# Patient Record
Sex: FEMALE | Race: WHITE | Employment: FULL TIME | URBAN - METROPOLITAN AREA
[De-identification: names, ages, dates, MRNs, and addresses within clinical notes are randomized per-mention and may not be internally consistent; named-entity substitution may affect disease eponyms.]

---

## 2017-05-13 ENCOUNTER — HOSPITAL ENCOUNTER (OUTPATIENT)
Age: 37
Discharge: HOME | End: 2017-05-13
Attending: FAMILY MEDICINE
Payer: COMMERCIAL

## 2017-05-13 VITALS
BODY MASS INDEX: 36.7 KG/M2 | HEART RATE: 94 BPM | DIASTOLIC BLOOD PRESSURE: 78 MMHG | SYSTOLIC BLOOD PRESSURE: 150 MMHG | HEIGHT: 64 IN | OXYGEN SATURATION: 99 % | TEMPERATURE: 99 F | RESPIRATION RATE: 18 BRPM | WEIGHT: 215 LBS

## 2017-05-13 DIAGNOSIS — B96.89 ACUTE BACTERIAL TONSILLITIS: Primary | ICD-10-CM

## 2017-05-13 DIAGNOSIS — J03.80 ACUTE BACTERIAL TONSILLITIS: Primary | ICD-10-CM

## 2017-05-13 PROCEDURE — 99203 OFFICE O/P NEW LOW 30 MIN: CPT

## 2017-05-13 PROCEDURE — 99204 OFFICE O/P NEW MOD 45 MIN: CPT

## 2017-05-13 PROCEDURE — 87430 STREP A AG IA: CPT

## 2017-05-13 RX ORDER — AMOXICILLIN AND CLAVULANATE POTASSIUM 875; 125 MG/1; MG/1
1 TABLET, FILM COATED ORAL 2 TIMES DAILY
Qty: 20 TABLET | Refills: 0 | Status: SHIPPED | OUTPATIENT
Start: 2017-05-13 | End: 2017-05-23

## 2017-05-13 NOTE — ED INITIAL ASSESSMENT (HPI)
Per pt has been having sore throat for 2 days reports saw white patch also reports subjective fevers

## 2017-05-13 NOTE — ED PROVIDER NOTES
Patient Seen in: [address]    History   Patient presents with:  Sore Throat    Stated Complaint: sore throat    HPI    31year old patient with no significant PMHx presents with  sore throat for 2 days.   Pain with swallow are symmetrical with mod enlargement and erythema. + white exudate. Uvula midline.   LUNGS: b/l CTA, good air entry  CARDIO: RRR without murmur  EXTREMITIES: no cyanosis, clubbing or edema  SKIN: good skin turgor, no rashes          ED Course     Labs Revi

## 2017-06-16 ENCOUNTER — APPOINTMENT (OUTPATIENT)
Dept: LAB | Facility: REFERENCE LAB | Age: 37
End: 2017-06-16
Attending: OBSTETRICS & GYNECOLOGY
Payer: COMMERCIAL

## 2017-06-16 ENCOUNTER — OFFICE VISIT (OUTPATIENT)
Dept: OBGYN CLINIC | Facility: CLINIC | Age: 37
End: 2017-06-16

## 2017-06-16 VITALS
BODY MASS INDEX: 36.94 KG/M2 | DIASTOLIC BLOOD PRESSURE: 60 MMHG | WEIGHT: 216.38 LBS | SYSTOLIC BLOOD PRESSURE: 120 MMHG | HEIGHT: 64 IN

## 2017-06-16 DIAGNOSIS — Z12.4 ROUTINE CERVICAL SMEAR: ICD-10-CM

## 2017-06-16 DIAGNOSIS — N89.8 VAGINAL DISCHARGE: ICD-10-CM

## 2017-06-16 DIAGNOSIS — Z01.419 WOMEN'S ANNUAL ROUTINE GYNECOLOGICAL EXAMINATION: ICD-10-CM

## 2017-06-16 DIAGNOSIS — Z11.3 SCREENING EXAMINATION FOR VENEREAL DISEASE: Primary | ICD-10-CM

## 2017-06-16 DIAGNOSIS — Z11.3 SCREENING EXAMINATION FOR VENEREAL DISEASE: ICD-10-CM

## 2017-06-16 PROCEDURE — 99385 PREV VISIT NEW AGE 18-39: CPT | Performed by: OBSTETRICS & GYNECOLOGY

## 2017-06-16 PROCEDURE — 87491 CHLMYD TRACH DNA AMP PROBE: CPT | Performed by: OBSTETRICS & GYNECOLOGY

## 2017-06-16 PROCEDURE — 87624 HPV HI-RISK TYP POOLED RSLT: CPT | Performed by: OBSTETRICS & GYNECOLOGY

## 2017-06-16 PROCEDURE — 87591 N.GONORRHOEAE DNA AMP PROB: CPT | Performed by: OBSTETRICS & GYNECOLOGY

## 2017-06-16 PROCEDURE — 36415 COLL VENOUS BLD VENIPUNCTURE: CPT | Performed by: OBSTETRICS & GYNECOLOGY

## 2017-06-16 PROCEDURE — 87106 FUNGI IDENTIFICATION YEAST: CPT | Performed by: OBSTETRICS & GYNECOLOGY

## 2017-06-16 PROCEDURE — 88175 CYTOPATH C/V AUTO FLUID REDO: CPT | Performed by: OBSTETRICS & GYNECOLOGY

## 2017-06-16 PROCEDURE — 87205 SMEAR GRAM STAIN: CPT | Performed by: OBSTETRICS & GYNECOLOGY

## 2017-06-16 PROCEDURE — 87808 TRICHOMONAS ASSAY W/OPTIC: CPT | Performed by: OBSTETRICS & GYNECOLOGY

## 2017-06-16 NOTE — PROGRESS NOTES
GYN H&P     2017  8:27 AM    CC: Patient is here to establish care    HPI: Patient is a 31year old  here for annual and PAP requesting screening with concerns about persistent vaginal odor and discharge that is more prominent after menses.  No bilaterally nontender, no palpable masses, no nipple discharge, no skin changes, no axillary adenopathy  ABDOMEN: Soft, non distended; non tender, no masses  GYNE/:   External Genitalia: normal, no lesions, good perineal support  Urethra: meatus normal

## 2017-06-21 ENCOUNTER — TELEPHONE (OUTPATIENT)
Dept: OBGYN CLINIC | Facility: CLINIC | Age: 37
End: 2017-06-21

## 2017-06-21 RX ORDER — METRONIDAZOLE 500 MG/1
500 TABLET ORAL ONCE
Qty: 4 TABLET | Refills: 0 | Status: SHIPPED | OUTPATIENT
Start: 2017-06-21 | End: 2017-06-21

## 2017-06-21 NOTE — TELEPHONE ENCOUNTER
----- Message from Seferino Schwarz MD sent at 6/21/2017  2:46 PM CDT -----  PAP normal, HPV negative. Next annual one year/next PAP in 3    Trichomonas on PAP requiring treatment of you and partner  eRx for 2g.  oral Flagyl x one dose will be done today

## 2017-06-22 NOTE — TELEPHONE ENCOUNTER
Pt notified of lab test results. Notified of positve trich result. STD instructions given. Prescription for Flagyl sent to pharmacy. Pt voiced understanding.

## 2017-06-30 ENCOUNTER — PRIOR ORIGINAL RECORDS (OUTPATIENT)
Dept: OTHER | Age: 37
End: 2017-06-30

## 2017-06-30 ENCOUNTER — HOSPITAL ENCOUNTER (OUTPATIENT)
Dept: CV DIAGNOSTICS | Facility: HOSPITAL | Age: 37
Discharge: HOME | End: 2017-06-30
Attending: INTERNAL MEDICINE
Payer: COMMERCIAL

## 2017-06-30 ENCOUNTER — MYAURORA ACCOUNT LINK (OUTPATIENT)
Dept: OTHER | Age: 37
End: 2017-06-30

## 2017-06-30 DIAGNOSIS — R06.00 DYSPNEA: ICD-10-CM

## 2017-06-30 DIAGNOSIS — R00.2 PALPITATIONS: ICD-10-CM

## 2017-06-30 PROCEDURE — 93226 XTRNL ECG REC<48 HR SCAN A/R: CPT | Performed by: INTERNAL MEDICINE

## 2017-06-30 PROCEDURE — 93225 XTRNL ECG REC<48 HRS REC: CPT | Performed by: INTERNAL MEDICINE

## 2017-06-30 PROCEDURE — 93227 XTRNL ECG REC<48 HR R&I: CPT | Performed by: INTERNAL MEDICINE

## 2017-08-12 ENCOUNTER — HOSPITAL ENCOUNTER (OUTPATIENT)
Dept: CV DIAGNOSTICS | Facility: HOSPITAL | Age: 37
Discharge: HOME | End: 2017-08-12
Attending: INTERNAL MEDICINE

## 2017-08-12 ENCOUNTER — MYAURORA ACCOUNT LINK (OUTPATIENT)
Dept: OTHER | Age: 37
End: 2017-08-12

## 2017-08-12 DIAGNOSIS — R06.00 DYSPNEA, PAROXYSMAL NOCTURNAL: ICD-10-CM

## 2017-08-12 DIAGNOSIS — R00.2 PALPITATIONS: ICD-10-CM

## 2017-08-18 ENCOUNTER — PRIOR ORIGINAL RECORDS (OUTPATIENT)
Dept: OTHER | Age: 37
End: 2017-08-18

## 2018-01-08 ENCOUNTER — APPOINTMENT (OUTPATIENT)
Dept: GENERAL RADIOLOGY | Age: 38
End: 2018-01-08
Attending: FAMILY MEDICINE
Payer: COMMERCIAL

## 2018-01-08 ENCOUNTER — HOSPITAL ENCOUNTER (OUTPATIENT)
Age: 38
Discharge: HOME | End: 2018-01-08
Attending: FAMILY MEDICINE
Payer: COMMERCIAL

## 2018-01-08 VITALS
HEART RATE: 85 BPM | OXYGEN SATURATION: 100 % | SYSTOLIC BLOOD PRESSURE: 152 MMHG | DIASTOLIC BLOOD PRESSURE: 92 MMHG | TEMPERATURE: 99 F | RESPIRATION RATE: 20 BRPM

## 2018-01-08 DIAGNOSIS — S00.93XA CONTUSION OF HEAD, UNSPECIFIED PART OF HEAD, INITIAL ENCOUNTER: ICD-10-CM
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
## 2018-01-08 DIAGNOSIS — S20.222A BACK CONTUSION, LEFT, INITIAL ENCOUNTER: ICD-10-CM

## 2018-01-08 PROCEDURE — 99204 OFFICE O/P NEW MOD 45 MIN: CPT

## 2018-01-08 PROCEDURE — 72040 X-RAY EXAM NECK SPINE 2-3 VW: CPT | Performed by: FAMILY MEDICINE

## 2018-01-08 PROCEDURE — 99203 OFFICE O/P NEW LOW 30 MIN: CPT

## 2018-01-08 PROCEDURE — 72072 X-RAY EXAM THORAC SPINE 3VWS: CPT | Performed by: FAMILY MEDICINE

## 2018-01-08 RX ORDER — CYCLOBENZAPRINE HCL 10 MG
10 TABLET ORAL 3 TIMES DAILY PRN
Qty: 20 TABLET | Refills: 0 | Status: SHIPPED | OUTPATIENT
Start: 2018-01-08 | End: 2018-01-15

## 2018-01-09 NOTE — ED PROVIDER NOTES
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 Tenderness over the C-spine and the lower thoracic spine   Neurological: She is alert and oriented to person, place, and time. Skin: Skin is warm. Psychiatric: She has a normal mood and affect.  Her behavior is normal.   Eladio

## 2018-01-09 NOTE — ED INITIAL ASSESSMENT (HPI)
Pt here stating that she tripped over her dog and fell down the stairs and hit her head, pt states she did not lose consciousness but felt dizzy and was not able to sleep due head pain, pt states she has body aches as well from the fall

## 2018-01-09 NOTE — ED NOTES
Pt discharged home, prescription given to patient, pt instructed to follow up with primary md if symptoms do not improve

## 2018-11-14 ENCOUNTER — OFFICE VISIT (OUTPATIENT)
Dept: OBGYN CLINIC | Facility: CLINIC | Age: 38
End: 2018-11-14

## 2018-11-14 VITALS
HEIGHT: 64 IN | WEIGHT: 217.13 LBS | BODY MASS INDEX: 37.07 KG/M2 | DIASTOLIC BLOOD PRESSURE: 66 MMHG | SYSTOLIC BLOOD PRESSURE: 120 MMHG

## 2018-11-14 DIAGNOSIS — Z01.419 WOMEN'S ANNUAL ROUTINE GYNECOLOGICAL EXAMINATION: Primary | ICD-10-CM

## 2018-11-14 PROCEDURE — 99395 PREV VISIT EST AGE 18-39: CPT | Performed by: OBSTETRICS & GYNECOLOGY

## 2018-11-14 NOTE — PROGRESS NOTES
GYN ANNUAL    2018  4:23 PM    CC:Patient presents for yearly visit    HPI: Patient is a 41year old  here for annual gyn exam. Cycles regular. No pelvic pain. No abnormal vaginal discharge or bleeding.        OB History    Para Term Pre Exposure: Not Asked        Hobby Hazards: Not Asked        Sleep Concern: Not Asked        Stress Concern: Not Asked        Weight Concern: Not Asked        Special Diet: Not Asked        Back Care: Not Asked        Exercise: Not Asked        Bike Helmet: Author MD Renetta

## 2019-01-12 ENCOUNTER — HOSPITAL ENCOUNTER (OUTPATIENT)
Age: 39
Discharge: HOME | End: 2019-01-12
Payer: COMMERCIAL

## 2019-01-12 ENCOUNTER — APPOINTMENT (OUTPATIENT)
Dept: GENERAL RADIOLOGY | Age: 39
End: 2019-01-12
Attending: FAMILY MEDICINE
Payer: COMMERCIAL

## 2019-01-12 VITALS
SYSTOLIC BLOOD PRESSURE: 158 MMHG | HEART RATE: 114 BPM | OXYGEN SATURATION: 99 % | DIASTOLIC BLOOD PRESSURE: 81 MMHG | RESPIRATION RATE: 20 BRPM | TEMPERATURE: 99 F

## 2019-01-12 DIAGNOSIS — S63.642A SPRAIN OF METACARPOPHALANGEAL (MCP) JOINT OF LEFT THUMB, INITIAL ENCOUNTER: Primary | ICD-10-CM

## 2019-01-12 PROCEDURE — 99213 OFFICE O/P EST LOW 20 MIN: CPT

## 2019-01-12 PROCEDURE — 73140 X-RAY EXAM OF FINGER(S): CPT | Performed by: FAMILY MEDICINE

## 2019-01-12 RX ORDER — IBUPROFEN 600 MG/1
600 TABLET ORAL EVERY 8 HOURS PRN
Qty: 30 TABLET | Refills: 0 | Status: SHIPPED | OUTPATIENT
Start: 2019-01-12 | End: 2019-01-19

## 2019-01-12 NOTE — ED INITIAL ASSESSMENT (HPI)
Pt here with left thumb pain, pt states the pain started 2 weeks ago ,she suspects she strained it at work, pt states the pain shoots up to her arm every time she has any thumb movement

## 2019-01-12 NOTE — ED PROVIDER NOTES
Patient Seen in: [address]    History   Patient presents with:  Finger Pain    Stated Complaint: left thumb pain    HPI    16-year-old female presents with 3 weeks of intermittent thumb pain after attempting to lift a w normal limits   Neurological: She is alert and oriented to person, place, and time. Skin: Skin is warm. Capillary refill takes less than 2 seconds. Psychiatric: She has a normal mood and affect.  Her behavior is normal.   Nursing note and vitals reviewe

## 2019-02-28 VITALS
HEIGHT: 65 IN | SYSTOLIC BLOOD PRESSURE: 96 MMHG | WEIGHT: 215 LBS | HEART RATE: 82 BPM | DIASTOLIC BLOOD PRESSURE: 72 MMHG | BODY MASS INDEX: 35.82 KG/M2

## 2019-05-16 ENCOUNTER — APPOINTMENT (OUTPATIENT)
Dept: GENERAL RADIOLOGY | Age: 39
End: 2019-05-16
Attending: FAMILY MEDICINE
Payer: COMMERCIAL

## 2019-05-16 ENCOUNTER — HOSPITAL ENCOUNTER (OUTPATIENT)
Age: 39
Discharge: HOME | End: 2019-05-16
Attending: FAMILY MEDICINE
Payer: COMMERCIAL

## 2019-05-16 VITALS
HEIGHT: 64 IN | WEIGHT: 226 LBS | TEMPERATURE: 98 F | BODY MASS INDEX: 38.58 KG/M2 | HEART RATE: 87 BPM | DIASTOLIC BLOOD PRESSURE: 78 MMHG | SYSTOLIC BLOOD PRESSURE: 128 MMHG | RESPIRATION RATE: 18 BRPM | OXYGEN SATURATION: 100 %

## 2019-05-16 DIAGNOSIS — R03.0 ELEVATED BLOOD PRESSURE READING WITHOUT DIAGNOSIS OF HYPERTENSION: Primary | ICD-10-CM

## 2019-05-16 DIAGNOSIS — M79.644 PAIN OF RIGHT THUMB: ICD-10-CM

## 2019-05-16 PROCEDURE — 93005 ELECTROCARDIOGRAM TRACING: CPT

## 2019-05-16 PROCEDURE — 93010 ELECTROCARDIOGRAM REPORT: CPT | Performed by: FAMILY MEDICINE

## 2019-05-16 PROCEDURE — 80047 BASIC METABLC PNL IONIZED CA: CPT

## 2019-05-16 PROCEDURE — 36415 COLL VENOUS BLD VENIPUNCTURE: CPT

## 2019-05-16 PROCEDURE — 93010 ELECTROCARDIOGRAM REPORT: CPT

## 2019-05-16 PROCEDURE — 99215 OFFICE O/P EST HI 40 MIN: CPT

## 2019-05-16 PROCEDURE — 73140 X-RAY EXAM OF FINGER(S): CPT | Performed by: FAMILY MEDICINE

## 2019-05-16 PROCEDURE — 85025 COMPLETE CBC W/AUTO DIFF WBC: CPT | Performed by: FAMILY MEDICINE

## 2019-05-16 PROCEDURE — 99214 OFFICE O/P EST MOD 30 MIN: CPT

## 2019-05-16 RX ORDER — INDOMETHACIN 50 MG/1
50 CAPSULE ORAL
Qty: 30 CAPSULE | Refills: 0 | Status: SHIPPED | OUTPATIENT
Start: 2019-05-16 | End: 2019-11-25 | Stop reason: ALTCHOICE

## 2019-05-16 NOTE — ED NOTES
Pt discharged by self in stable and good condition. Reviewed meds and avs. Follow up as indicated. Pt verbalized understanding and agreed.

## 2019-05-16 NOTE — ED INITIAL ASSESSMENT (HPI)
Pt in IC by self c/o right first digit pain and swelling for 1-2 days. Feels painful, red, and swollen. Denied fever, chest pain, sob, n/v/d. Took allegra and tylenol.

## 2019-05-16 NOTE — ED PROVIDER NOTES
Patient Seen in: [address]    History   Patient presents with:  Finger Pain    Stated Complaint: Finger pain, swollen feet    HPI  46yo F presents to IC with right thumb pain, redness and swelling that started last nigh time. She appears well-developed. No distress. Obese habitus   Cardiovascular: Normal rate, regular rhythm, normal heart sounds and intact distal pulses. Exam reveals no gallop and no friction rub. No murmur heard.   Pulmonary/Chest: Effort normal and b 2 VIEWS), LEFT THUMB (CPT=73140), 1/12/2019, 15:51.     INDICATIONS: Pain and swelling of the 1st digit of right hand for 2 days; no known injury.     TECHNIQUE: 3 views were obtained.       FINDINGS:   BONES: Faint nodular calcifications in the dorsal asp Clinical Impression:  Elevated blood pressure reading without diagnosis of hypertension  (primary encounter diagnosis)  Pain of right thumb    Disposition:  Discharge  5/16/2019  5:59 pm    Follow-up:  Rei Andrade MD  [address] [place] [address]

## 2019-09-03 ENCOUNTER — APPOINTMENT (OUTPATIENT)
Dept: CT IMAGING | Facility: HOSPITAL | Age: 39
End: 2019-09-03
Attending: EMERGENCY MEDICINE
Payer: COMMERCIAL

## 2019-09-03 ENCOUNTER — HOSPITAL ENCOUNTER (OUTPATIENT)
Age: 39
Discharge: ANOTHER HOSPITAL | End: 2019-09-03
Attending: EMERGENCY MEDICINE
Payer: COMMERCIAL

## 2019-09-03 ENCOUNTER — HOSPITAL ENCOUNTER (EMERGENCY)
Facility: HOSPITAL | Age: 39
Discharge: HOME | End: 2019-09-03
Attending: EMERGENCY MEDICINE
Payer: COMMERCIAL

## 2019-09-03 VITALS
DIASTOLIC BLOOD PRESSURE: 93 MMHG | TEMPERATURE: 99 F | RESPIRATION RATE: 18 BRPM | BODY MASS INDEX: 37.39 KG/M2 | HEIGHT: 64 IN | OXYGEN SATURATION: 99 % | SYSTOLIC BLOOD PRESSURE: 134 MMHG | WEIGHT: 219 LBS | HEART RATE: 89 BPM

## 2019-09-03 VITALS
SYSTOLIC BLOOD PRESSURE: 142 MMHG | OXYGEN SATURATION: 99 % | RESPIRATION RATE: 16 BRPM | DIASTOLIC BLOOD PRESSURE: 87 MMHG | HEIGHT: 64 IN | BODY MASS INDEX: 37.39 KG/M2 | HEART RATE: 95 BPM | TEMPERATURE: 98 F | WEIGHT: 219 LBS

## 2019-09-03 DIAGNOSIS — S30.1XXA CONTUSION OF ABDOMINAL WALL, INITIAL ENCOUNTER: Primary | ICD-10-CM

## 2019-09-03 DIAGNOSIS — M54.42 ACUTE LEFT-SIDED LOW BACK PAIN WITH LEFT-SIDED SCIATICA: ICD-10-CM

## 2019-09-03 DIAGNOSIS — S30.1XXA CONTUSION OF ABDOMINAL WALL, INITIAL ENCOUNTER: ICD-10-CM

## 2019-09-03 DIAGNOSIS — R10.9 ABDOMINAL PAIN OF UNKNOWN ETIOLOGY: Primary | ICD-10-CM

## 2019-09-03 LAB
ANION GAP SERPL CALC-SCNC: 7 MMOL/L (ref 0–18)
BASOPHILS # BLD AUTO: 0.02 X10(3) UL (ref 0–0.2)
BASOPHILS NFR BLD AUTO: 0.2 %
BUN BLD-MCNC: 13 MG/DL (ref 7–18)
BUN/CREAT SERPL: 17.8 (ref 10–20)
CALCIUM BLD-MCNC: 9.3 MG/DL (ref 8.5–10.1)
CHLORIDE SERPL-SCNC: 107 MMOL/L (ref 98–112)
CO2 SERPL-SCNC: 28 MMOL/L (ref 21–32)
CREAT BLD-MCNC: 0.73 MG/DL (ref 0.55–1.02)
DEPRECATED RDW RBC AUTO: 44.2 FL (ref 35.1–46.3)
EOSINOPHIL # BLD AUTO: 0.07 X10(3) UL (ref 0–0.7)
EOSINOPHIL NFR BLD AUTO: 0.7 %
ERYTHROCYTE [DISTWIDTH] IN BLOOD BY AUTOMATED COUNT: 13.1 % (ref 11–15)
GLUCOSE BLD-MCNC: 96 MG/DL (ref 70–99)
HCT VFR BLD AUTO: 39.1 % (ref 35–48)
HGB BLD-MCNC: 12.6 G/DL (ref 12–16)
IMM GRANULOCYTES # BLD AUTO: 0.03 X10(3) UL (ref 0–1)
IMM GRANULOCYTES NFR BLD: 0.3 %
LYMPHOCYTES # BLD AUTO: 2.08 X10(3) UL (ref 1–4)
LYMPHOCYTES NFR BLD AUTO: 21.3 %
MCH RBC QN AUTO: 29.9 PG (ref 26–34)
MCHC RBC AUTO-ENTMCNC: 32.2 G/DL (ref 31–37)
MCV RBC AUTO: 92.7 FL (ref 80–100)
MONOCYTES # BLD AUTO: 0.58 X10(3) UL (ref 0.1–1)
MONOCYTES NFR BLD AUTO: 5.9 %
NEUTROPHILS # BLD AUTO: 7 X10 (3) UL (ref 1.5–7.7)
NEUTROPHILS # BLD AUTO: 7 X10(3) UL (ref 1.5–7.7)
NEUTROPHILS NFR BLD AUTO: 71.6 %
OSMOLALITY SERPL CALC.SUM OF ELEC: 294 MOSM/KG (ref 275–295)
PLATELET # BLD AUTO: 334 10(3)UL (ref 150–450)
POTASSIUM SERPL-SCNC: 3.6 MMOL/L (ref 3.5–5.1)
RBC # BLD AUTO: 4.22 X10(6)UL (ref 3.8–5.3)
SODIUM SERPL-SCNC: 142 MMOL/L (ref 136–145)
WBC # BLD AUTO: 9.8 X10(3) UL (ref 4–11)

## 2019-09-03 PROCEDURE — 99213 OFFICE O/P EST LOW 20 MIN: CPT

## 2019-09-03 PROCEDURE — 99284 EMERGENCY DEPT VISIT MOD MDM: CPT

## 2019-09-03 PROCEDURE — 96374 THER/PROPH/DIAG INJ IV PUSH: CPT

## 2019-09-03 PROCEDURE — 85025 COMPLETE CBC W/AUTO DIFF WBC: CPT | Performed by: EMERGENCY MEDICINE

## 2019-09-03 PROCEDURE — 80048 BASIC METABOLIC PNL TOTAL CA: CPT | Performed by: EMERGENCY MEDICINE

## 2019-09-03 PROCEDURE — 74176 CT ABD & PELVIS W/O CONTRAST: CPT | Performed by: EMERGENCY MEDICINE

## 2019-09-03 PROCEDURE — 99212 OFFICE O/P EST SF 10 MIN: CPT

## 2019-09-03 RX ORDER — HYDROCODONE BITARTRATE AND ACETAMINOPHEN 5; 325 MG/1; MG/1
2 TABLET ORAL ONCE
Status: DISCONTINUED | OUTPATIENT
Start: 2019-09-03 | End: 2019-09-03

## 2019-09-03 RX ORDER — KETOROLAC TROMETHAMINE 30 MG/ML
30 INJECTION, SOLUTION INTRAMUSCULAR; INTRAVENOUS ONCE
Status: COMPLETED | OUTPATIENT
Start: 2019-09-03 | End: 2019-09-03

## 2019-09-03 NOTE — ED PROVIDER NOTES
Patient Seen in: [address]    History   Patient presents with:  Fall (musculoskeletal, neurologic)    Stated Complaint: FALL LT LOWER SIDE INJURY    HPI    43 yo female tripped and fell early Sunday morning.  When she fe exhibits no distension and no mass. Bruising and scab to the left side of the abdomen. There is tenderness to the left side of the abdomen when palpating away from the bruise. Also right CVA tenderness. Musculoskeletal: Normal range of motion.  She exh

## 2019-09-03 NOTE — ED INITIAL ASSESSMENT (HPI)
Per pt fell on Sunday night tripped over something landed on vacuum  reports left side bruising and abrasion to abdomen and now reports left leg numbing feeling. Denies any back injury.

## 2019-09-03 NOTE — ED INITIAL ASSESSMENT (HPI)
Pt fell on Sunday night, tripped over her dog and fell on top of him. Denies LOC, denies hitting her head. Pt has a bruise on the abd and severe abd pain. Denies urinary symptoms.  Denies N/V.

## 2019-09-04 NOTE — ED NOTES
Pt presented with norco for pain, pt states \"that stuff makes me freak out. \" Pills were placed in pill cup for administration, but wasted through pyxis. MD Fabiola Cabrales made aware.

## 2019-09-04 NOTE — ED PROVIDER NOTES
Patient Seen in: [facility] Emergency Department    History   Patient presents with:  Fall (musculoskeletal, neurologic)      HPI    Patient presents to the ED complaining of pain in her left lower quadrant and a large bruise in this area as well pertinent positives to the presenting problem noted.     Physical Exam     ED Triage Vitals [09/03/19 1826]   /85   Pulse 91   Resp 18   Temp 98.8 °F (37.1 °C)   Temp src Oral   SpO2 99 %   O2 Device None (Room air)       Physical Exam   Constitutiona right renal calculus. 3. Cholelithiasis without CT evidence of acute complication. 4. Hepatomegaly with underlying hepatic steatosis. 5. Right adnexal follicular cystic lesion.   6. Low-density appearance of the intracardiac blood pool raises the possibi Impression:  Contusion of abdominal wall, initial encounter  (primary encounter diagnosis)  Acute left-sided low back pain with left-sided sciatica    Disposition:  Discharge    Follow-up:  Lisandra Lemus, [address]  [address] [place] [phone]

## 2019-11-25 ENCOUNTER — HOSPITAL ENCOUNTER (OUTPATIENT)
Age: 39
Discharge: HOME | End: 2019-11-25
Attending: EMERGENCY MEDICINE
Payer: COMMERCIAL

## 2019-11-25 ENCOUNTER — APPOINTMENT (OUTPATIENT)
Dept: GENERAL RADIOLOGY | Age: 39
End: 2019-11-25
Attending: EMERGENCY MEDICINE
Payer: COMMERCIAL

## 2019-11-25 VITALS
DIASTOLIC BLOOD PRESSURE: 90 MMHG | OXYGEN SATURATION: 97 % | RESPIRATION RATE: 18 BRPM | TEMPERATURE: 98 F | SYSTOLIC BLOOD PRESSURE: 135 MMHG | HEART RATE: 98 BPM

## 2019-11-25 DIAGNOSIS — M70.61 TROCHANTERIC BURSITIS OF RIGHT HIP: Primary | ICD-10-CM

## 2019-11-25 PROCEDURE — 99213 OFFICE O/P EST LOW 20 MIN: CPT

## 2019-11-25 PROCEDURE — 99214 OFFICE O/P EST MOD 30 MIN: CPT

## 2019-11-25 PROCEDURE — 73502 X-RAY EXAM HIP UNI 2-3 VIEWS: CPT | Performed by: EMERGENCY MEDICINE

## 2019-11-25 RX ORDER — METHYLPREDNISOLONE 4 MG/1
TABLET ORAL
Qty: 1 PACKAGE | Refills: 0 | Status: SHIPPED | OUTPATIENT
Start: 2019-11-25 | End: 2020-07-14

## 2019-11-25 RX ORDER — PREDNISONE 20 MG/1
60 TABLET ORAL ONCE
Status: COMPLETED | OUTPATIENT
Start: 2019-11-25 | End: 2019-11-25

## 2019-11-25 NOTE — ED INITIAL ASSESSMENT (HPI)
Pt with hx of right hip bursitis  Pt reports unable to manage pain with Naproxen  Recent flare up started 2 weeks ago

## 2019-11-25 NOTE — ED PROVIDER NOTES
Patient Seen in: [facility] In [address] [place] [address]    History   Patient presents with:  Hip Pain    Stated Complaint: rt hip pain    HPI    HPI: Ta Rothman is a 42year old female who presents with pain in the right hip, patient has a history Pulse 98   Temp 98 °F (36.7 °C) (Oral)   Resp 18   LMP 11/10/2019 (Exact Date)   SpO2 97%         Physical Exam      MENTAL STATUS: Alert, oriented, and cooperative.  No focal deficit  HEAD: Atraumatic  NECK: Supple, full range of motion without pain or par 12:00 PM    START taking these medications    methylPREDNISolone 4 MG Oral Tablet Therapy Pack  Dosepack: use as directed on packaging, Normal, Disp-1 Package, R-0

## 2020-03-30 NOTE — ED NOTES
Per MD recommendation will go to [address] [place] [address] for further evaluation of symptoms.  Pt leaving IC stable no distress leaving stable no acute distress noted 2

## 2020-07-14 ENCOUNTER — OFFICE VISIT (OUTPATIENT)
Dept: OBGYN CLINIC | Facility: CLINIC | Age: 40
End: 2020-07-14

## 2020-07-14 ENCOUNTER — TELEPHONE (OUTPATIENT)
Dept: OBGYN CLINIC | Facility: CLINIC | Age: 40
End: 2020-07-14

## 2020-07-14 VITALS
WEIGHT: 218.19 LBS | DIASTOLIC BLOOD PRESSURE: 70 MMHG | HEIGHT: 64 IN | HEART RATE: 97 BPM | SYSTOLIC BLOOD PRESSURE: 120 MMHG | BODY MASS INDEX: 37.25 KG/M2

## 2020-07-14 DIAGNOSIS — N92.1 MENORRHAGIA WITH IRREGULAR CYCLE: ICD-10-CM

## 2020-07-14 DIAGNOSIS — N93.9 ABNORMAL UTERINE BLEEDING (AUB): Primary | ICD-10-CM

## 2020-07-14 DIAGNOSIS — E66.9 OBESITY (BMI 35.0-39.9 WITHOUT COMORBIDITY): ICD-10-CM

## 2020-07-14 PROCEDURE — 99214 OFFICE O/P EST MOD 30 MIN: CPT | Performed by: OBSTETRICS & GYNECOLOGY

## 2020-07-14 PROCEDURE — 3074F SYST BP LT 130 MM HG: CPT | Performed by: OBSTETRICS & GYNECOLOGY

## 2020-07-14 PROCEDURE — 3078F DIAST BP <80 MM HG: CPT | Performed by: OBSTETRICS & GYNECOLOGY

## 2020-07-14 PROCEDURE — 3008F BODY MASS INDEX DOCD: CPT | Performed by: OBSTETRICS & GYNECOLOGY

## 2020-07-14 RX ORDER — MEDROXYPROGESTERONE ACETATE 10 MG/1
10 TABLET ORAL DAILY
Qty: 30 TABLET | Refills: 1 | Status: SHIPPED | OUTPATIENT
Start: 2020-07-14 | End: 2020-07-14

## 2020-07-14 RX ORDER — MEDROXYPROGESTERONE ACETATE 10 MG/1
10 TABLET ORAL DAILY
Qty: 90 TABLET | Refills: 0 | Status: SHIPPED | OUTPATIENT
Start: 2020-07-14 | End: 2020-08-19

## 2020-07-14 NOTE — TELEPHONE ENCOUNTER
Fax received from [facility] requesting 90 day supply of Provera. OK per Dr. Tere Engle.  New script sent electronically.

## 2020-07-16 PROBLEM — N92.1 MENORRHAGIA WITH IRREGULAR CYCLE: Status: ACTIVE | Noted: 2020-07-16

## 2020-07-16 NOTE — PROGRESS NOTES
OB/GYN H&P       CC: Patient presents with:  New Patient: pt here because of abnormal bleeding LMP -, pt concerned due to family hx of liver cancer       HPI: Yandy Escudero is a 47year old  here for consultation regarding her prolonged and hea Smoker      Smokeless tobacco: Never Used    Alcohol use: Yes      Alcohol/week: 0.0 standard drinks      Comment: social     Drug use: No      ROS:   A comprehensive 10 point review of systems was completed.   Pertinent positives and negatives noted in the

## 2020-07-20 ENCOUNTER — HOSPITAL ENCOUNTER (OUTPATIENT)
Dept: ULTRASOUND IMAGING | Facility: HOSPITAL | Age: 40
Discharge: HOME | End: 2020-07-20
Attending: FAMILY MEDICINE
Payer: COMMERCIAL

## 2020-07-20 DIAGNOSIS — N93.9 ABNORMAL UTERINE BLEEDING: ICD-10-CM

## 2020-07-20 PROCEDURE — 76856 US EXAM PELVIC COMPLETE: CPT | Performed by: FAMILY MEDICINE

## 2020-07-20 PROCEDURE — 76830 TRANSVAGINAL US NON-OB: CPT | Performed by: FAMILY MEDICINE

## 2020-07-21 ENCOUNTER — PATIENT MESSAGE (OUTPATIENT)
Dept: OBGYN CLINIC | Facility: CLINIC | Age: 40
End: 2020-07-21

## 2020-07-21 NOTE — TELEPHONE ENCOUNTER
US is unremarkable. If it has been more than 7 days since starting Provera and no improvement, then have her take it twice a day. Check and see if possible to have her seen sooner than 8/19 for her EMB, then discuss if it works for the patient.

## 2020-07-21 NOTE — TELEPHONE ENCOUNTER
From: Dinora Bryant  [facility]  Sent: 7/21/2020 12:28 PM CDT  Subject: Test Results Question    Dr. Nikia Amador office called about my ultrasound results. They said it showed 2 small fibroids and a cyst on my ovary.    I'm still taking the medication y

## 2020-07-23 NOTE — TELEPHONE ENCOUNTER
Pt started Provera on 7/16/20. Initially saw an increase in bleeding but has slowed down now and almost stopped. Pt advised to continue taking once per day. Patient verbalized understanding.   Pt said that all 3 offices were checked for appts when she sc

## 2020-07-25 ENCOUNTER — APPOINTMENT (OUTPATIENT)
Dept: GENERAL RADIOLOGY | Age: 40
End: 2020-07-25
Attending: FAMILY MEDICINE
Payer: COMMERCIAL

## 2020-07-25 ENCOUNTER — HOSPITAL ENCOUNTER (OUTPATIENT)
Age: 40
Discharge: HOME | End: 2020-07-25
Attending: FAMILY MEDICINE
Payer: COMMERCIAL

## 2020-07-25 VITALS
BODY MASS INDEX: 36.7 KG/M2 | SYSTOLIC BLOOD PRESSURE: 159 MMHG | OXYGEN SATURATION: 99 % | RESPIRATION RATE: 18 BRPM | WEIGHT: 215 LBS | TEMPERATURE: 97 F | DIASTOLIC BLOOD PRESSURE: 98 MMHG | HEART RATE: 92 BPM | HEIGHT: 64 IN

## 2020-07-25 DIAGNOSIS — M77.9 TENDINITIS: ICD-10-CM

## 2020-07-25 DIAGNOSIS — R03.0 ELEVATED BLOOD PRESSURE READING: ICD-10-CM

## 2020-07-25 DIAGNOSIS — M25.532 WRIST PAIN, LEFT: Primary | ICD-10-CM

## 2020-07-25 PROCEDURE — 99213 OFFICE O/P EST LOW 20 MIN: CPT | Performed by: FAMILY MEDICINE

## 2020-07-25 PROCEDURE — A6449 LT COMPRES BAND >=3" <5"/YD: HCPCS | Performed by: FAMILY MEDICINE

## 2020-07-25 PROCEDURE — 73110 X-RAY EXAM OF WRIST: CPT | Performed by: FAMILY MEDICINE

## 2020-07-25 NOTE — ED PROVIDER NOTES
Patient Seen in: [facility] In [address] [place] [address]    History   Patient presents with:  Upper Extremity Injury    Stated Complaint: Lt wrist pain    HPI    HPI: Chele Mckenzieire is a 47year old female who presents with a 1 week h/o worsening left kg/m²         Physical Exam   Constitutional: She is oriented to person, place, and time. She appears well-developed and well-nourished. HENT:   Head: Normocephalic and atraumatic. Eyes: Pupils are equal, round, and reactive to light.  Conjunctivae are

## 2020-08-19 ENCOUNTER — OFFICE VISIT (OUTPATIENT)
Dept: OBGYN CLINIC | Facility: CLINIC | Age: 40
End: 2020-08-19

## 2020-08-19 VITALS
TEMPERATURE: 97 F | WEIGHT: 223.63 LBS | BODY MASS INDEX: 38 KG/M2 | DIASTOLIC BLOOD PRESSURE: 82 MMHG | SYSTOLIC BLOOD PRESSURE: 122 MMHG

## 2020-08-19 DIAGNOSIS — N92.0 EXCESSIVE OR FREQUENT MENSTRUATION: Primary | ICD-10-CM

## 2020-08-19 DIAGNOSIS — Z01.812 PRE-PROCEDURAL LABORATORY EXAMINATION: ICD-10-CM

## 2020-08-19 LAB — CONTROL LINE PRESENT WITH A CLEAR BACKGROUND (YES/NO): YES YES/NO

## 2020-08-19 PROCEDURE — 58100 BIOPSY OF UTERUS LINING: CPT | Performed by: OBSTETRICS & GYNECOLOGY

## 2020-08-19 PROCEDURE — 88305 TISSUE EXAM BY PATHOLOGIST: CPT | Performed by: OBSTETRICS & GYNECOLOGY

## 2020-08-19 PROCEDURE — 3074F SYST BP LT 130 MM HG: CPT | Performed by: OBSTETRICS & GYNECOLOGY

## 2020-08-19 PROCEDURE — 81025 URINE PREGNANCY TEST: CPT | Performed by: OBSTETRICS & GYNECOLOGY

## 2020-08-19 PROCEDURE — 3079F DIAST BP 80-89 MM HG: CPT | Performed by: OBSTETRICS & GYNECOLOGY

## 2020-08-19 NOTE — PROCEDURES
Procedure: Endometrial biopsy     Date of Procedure: 20    Pre-procedure diagnosis:  AUB    Post-procedure diagnosis:   AUB    Indications:   47year old female  who presents for endometrial biopsy  AUB    Procedure details:   The procedure,

## 2020-12-14 ENCOUNTER — APPOINTMENT (OUTPATIENT)
Dept: CT IMAGING | Age: 40
End: 2020-12-14
Attending: EMERGENCY MEDICINE
Payer: COMMERCIAL

## 2020-12-14 ENCOUNTER — HOSPITAL ENCOUNTER (OUTPATIENT)
Age: 40
Discharge: HOME | End: 2020-12-14
Payer: COMMERCIAL

## 2020-12-14 VITALS
SYSTOLIC BLOOD PRESSURE: 147 MMHG | WEIGHT: 198 LBS | DIASTOLIC BLOOD PRESSURE: 90 MMHG | RESPIRATION RATE: 16 BRPM | TEMPERATURE: 99 F | HEART RATE: 84 BPM | BODY MASS INDEX: 33.8 KG/M2 | HEIGHT: 64 IN | OXYGEN SATURATION: 100 %

## 2020-12-14 DIAGNOSIS — R30.0 DYSURIA: Primary | ICD-10-CM

## 2020-12-14 DIAGNOSIS — N20.0 RIGHT RENAL STONE: ICD-10-CM

## 2020-12-14 PROCEDURE — 80047 BASIC METABLC PNL IONIZED CA: CPT | Performed by: EMERGENCY MEDICINE

## 2020-12-14 PROCEDURE — 81025 URINE PREGNANCY TEST: CPT | Performed by: EMERGENCY MEDICINE

## 2020-12-14 PROCEDURE — 36415 COLL VENOUS BLD VENIPUNCTURE: CPT | Performed by: EMERGENCY MEDICINE

## 2020-12-14 PROCEDURE — 99203 OFFICE O/P NEW LOW 30 MIN: CPT | Performed by: EMERGENCY MEDICINE

## 2020-12-14 PROCEDURE — 81002 URINALYSIS NONAUTO W/O SCOPE: CPT | Performed by: EMERGENCY MEDICINE

## 2020-12-14 PROCEDURE — 74176 CT ABD & PELVIS W/O CONTRAST: CPT | Performed by: EMERGENCY MEDICINE

## 2020-12-14 PROCEDURE — 85025 COMPLETE CBC W/AUTO DIFF WBC: CPT | Performed by: EMERGENCY MEDICINE

## 2020-12-14 RX ORDER — HYDROCODONE BITARTRATE AND ACETAMINOPHEN 10; 325 MG/1; MG/1
TABLET ORAL
Qty: 10 TABLET | Refills: 0 | Status: SHIPPED | OUTPATIENT
Start: 2020-12-14 | End: 2021-07-21

## 2020-12-14 RX ORDER — NAPROXEN 500 MG/1
500 TABLET ORAL 2 TIMES DAILY PRN
Qty: 20 TABLET | Refills: 0 | Status: SHIPPED | OUTPATIENT
Start: 2020-12-14 | End: 2021-07-21

## 2020-12-14 RX ORDER — CEPHALEXIN 500 MG/1
500 CAPSULE ORAL 2 TIMES DAILY
Qty: 20 CAPSULE | Refills: 0 | Status: SHIPPED | OUTPATIENT
Start: 2020-12-14 | End: 2020-12-24

## 2020-12-14 NOTE — ED INITIAL ASSESSMENT (HPI)
Pt states that she woke up at 330am with urgency with urination and pain. No fever or chills. Pt states she had low back pain at 430am and took Naproxen.

## 2020-12-14 NOTE — ED PROVIDER NOTES
Patient Seen in: [facility]      History   Patient presents with:  Urinary Symptoms    Stated Complaint: UTI? Gogo Drivers is a 47year old female here for sharp back and and urinary sx that begand last night around midnight.  She took [address] Exam  Vitals signs and nursing note reviewed. Constitutional:       Appearance: Normal appearance. Eyes:      Pupils: Pupils are equal, round, and reactive to light. Pulmonary:      Effort: No respiratory distress.    Abdominal:      General: Abdomen expectations, follow up, and ER precautions. I explained to the patient that emergent conditions may arise and to go to the ER for new, worsening or any persistent conditions. I've explained the importance of following up with their doctor as instructed.  Jackson Alan

## 2021-04-30 ENCOUNTER — HOSPITAL ENCOUNTER (OUTPATIENT)
Age: 41
Discharge: HOME | End: 2021-04-30
Payer: COMMERCIAL

## 2021-04-30 VITALS
WEIGHT: 210 LBS | OXYGEN SATURATION: 100 % | HEART RATE: 81 BPM | TEMPERATURE: 97 F | SYSTOLIC BLOOD PRESSURE: 147 MMHG | DIASTOLIC BLOOD PRESSURE: 85 MMHG | RESPIRATION RATE: 18 BRPM | HEIGHT: 64 IN | BODY MASS INDEX: 35.85 KG/M2

## 2021-04-30 DIAGNOSIS — S46.811A STRAIN OF RIGHT TRAPEZIUS MUSCLE, INITIAL ENCOUNTER: Primary | ICD-10-CM

## 2021-04-30 PROCEDURE — 99213 OFFICE O/P EST LOW 20 MIN: CPT | Performed by: NURSE PRACTITIONER

## 2021-04-30 RX ORDER — DIAZEPAM 5 MG/1
5 TABLET ORAL 3 TIMES DAILY PRN
Qty: 20 TABLET | Refills: 0 | Status: SHIPPED | OUTPATIENT
Start: 2021-04-30 | End: 2021-05-07

## 2021-04-30 RX ORDER — IBUPROFEN 800 MG/1
800 TABLET ORAL EVERY 8 HOURS PRN
Qty: 30 TABLET | Refills: 0 | Status: SHIPPED | OUTPATIENT
Start: 2021-04-30 | End: 2021-05-07

## 2021-04-30 RX ORDER — DIAZEPAM 5 MG/1
5 TABLET ORAL ONCE
Status: COMPLETED | OUTPATIENT
Start: 2021-04-30 | End: 2021-04-30

## 2021-04-30 RX ORDER — IBUPROFEN 600 MG/1
600 TABLET ORAL ONCE
Status: COMPLETED | OUTPATIENT
Start: 2021-04-30 | End: 2021-04-30

## 2021-04-30 RX ORDER — LIDOCAINE 50 MG/G
1 PATCH TOPICAL EVERY 24 HOURS
Qty: 7 PATCH | Refills: 0 | Status: SHIPPED | OUTPATIENT
Start: 2021-04-30 | End: 2021-05-07

## 2021-04-30 NOTE — ED PROVIDER NOTES
Patient Seen in: [facility]      History   Patient presents with:  Back Pain    Stated Complaint: Back Pain    HPI/Subjective:   HPI    46 yo female arrives to the ic with co rt trapezius pain, worse with movement and palpation, no rash, she Left Ear: External ear normal.      Nose: Nose normal.      Mouth/Throat:      Mouth: Mucous membranes are moist.      Pharynx: No oropharyngeal exudate or posterior oropharyngeal erythema.    Eyes:      Conjunctiva/sclera: Conjunctivae normal.      Pu Impression:  Strain of right trapezius muscle, initial encounter  (primary encounter diagnosis)     Disposition:  Discharge  4/30/2021 11:27 am    Follow-up:  Daniela Kelley MD  [address] [place] [address]  [phone]    Schedule an appoin

## 2021-05-17 ENCOUNTER — PATIENT MESSAGE (OUTPATIENT)
Dept: TELEHEALTH | Age: 41
End: 2021-05-17

## 2021-05-18 ENCOUNTER — OFFICE VISIT (OUTPATIENT)
Dept: OBGYN CLINIC | Facility: CLINIC | Age: 41
End: 2021-05-18

## 2021-05-18 VITALS
BODY MASS INDEX: 36.37 KG/M2 | HEART RATE: 86 BPM | HEIGHT: 64 IN | DIASTOLIC BLOOD PRESSURE: 96 MMHG | WEIGHT: 213 LBS | SYSTOLIC BLOOD PRESSURE: 146 MMHG

## 2021-05-18 DIAGNOSIS — N92.1 MENORRHAGIA WITH IRREGULAR CYCLE: Primary | ICD-10-CM

## 2021-05-18 PROCEDURE — 99214 OFFICE O/P EST MOD 30 MIN: CPT | Performed by: OBSTETRICS & GYNECOLOGY

## 2021-05-18 PROCEDURE — 3080F DIAST BP >= 90 MM HG: CPT | Performed by: OBSTETRICS & GYNECOLOGY

## 2021-05-18 PROCEDURE — 3077F SYST BP >= 140 MM HG: CPT | Performed by: OBSTETRICS & GYNECOLOGY

## 2021-05-18 PROCEDURE — 3008F BODY MASS INDEX DOCD: CPT | Performed by: OBSTETRICS & GYNECOLOGY

## 2021-05-18 RX ORDER — NORETHINDRONE AND ETHINYL ESTRADIOL 1 MG-35MCG
1 KIT ORAL DAILY
COMMUNITY
Start: 2021-05-03 | End: 2021-07-21

## 2021-05-18 RX ORDER — DEXAMETHASONE 4 MG/1
4 TABLET ORAL DAILY
COMMUNITY
Start: 2021-03-04 | End: 2021-07-21

## 2021-05-18 RX ORDER — NORELGESTROMIN AND ETHINYL ESTRADIOL 150; 35 UG/D; UG/D
1 PATCH TRANSDERMAL WEEKLY
Qty: 12 PATCH | Refills: 5 | Status: SHIPPED | OUTPATIENT
Start: 2021-05-18 | End: 2021-05-19 | Stop reason: CLARIF

## 2021-05-18 RX ORDER — CYCLOBENZAPRINE HCL 10 MG
TABLET ORAL
COMMUNITY
Start: 2021-05-03 | End: 2021-07-21

## 2021-05-18 NOTE — PROGRESS NOTES
Lamonte Candelaria is a 47year old female  Patient's last menstrual period was 2020. Patient presents with:  New Patient: Heavy bleeding for months at a time, clots size of lime. Cyst and fibroids found. EMB done   .      He does not desire pregnancy Service: Not Asked        Blood Transfusions: No        Caffeine Concern: Yes          occ        Occupational Exposure: Not Asked        Hobby Hazards: Not Asked        Sleep Concern: Not Asked        Stress Concern: Not Asked        Weight Concern: Not A mouth daily.  (Patient not taking: Reported on 5/18/2021 ), Disp: , Rfl:   •  cyclobenzaprine 10 MG Oral Tab, TAKE 1 TABLET BY MOUTH EVERY DAY AT BEDTIME FOR 15 DAYS AS NEEDED (Patient not taking: Reported on 5/18/2021), Disp: , Rfl:   •  HYDROcodone-acetam

## 2021-05-19 NOTE — TELEPHONE ENCOUNTER
Per KD, called pharmacy to cancel Roldan Breath patch rx and notified patient rx was cancelled and not to use samples provided yesterday.  Pt advised, BP reading was not updated in epic until after visit was over and was too elevated to have use of estrogen be con

## 2021-07-21 VITALS — HEIGHT: 64 IN | BODY MASS INDEX: 36.19 KG/M2 | WEIGHT: 212 LBS

## 2021-07-21 RX ORDER — SODIUM CHLORIDE, SODIUM LACTATE, POTASSIUM CHLORIDE, CALCIUM CHLORIDE 600; 310; 30; 20 MG/100ML; MG/100ML; MG/100ML; MG/100ML
INJECTION, SOLUTION INTRAVENOUS CONTINUOUS
Status: CANCELLED | OUTPATIENT
Start: 2021-07-21

## 2021-07-21 RX ORDER — FAMOTIDINE 20 MG/1
20 TABLET ORAL ONCE
Status: CANCELLED | OUTPATIENT
Start: 2021-07-21 | End: 2021-07-21

## 2021-07-21 RX ORDER — METOCLOPRAMIDE 10 MG/1
10 TABLET ORAL ONCE
Status: CANCELLED | OUTPATIENT
Start: 2021-07-21 | End: 2021-07-21

## 2021-07-21 RX ORDER — ACETAMINOPHEN 500 MG
1000 TABLET ORAL ONCE
Status: CANCELLED | OUTPATIENT
Start: 2021-07-21 | End: 2021-07-21

## 2021-07-21 NOTE — PAT NURSING NOTE
S/liliam Stark at Dr Lucy Johnson ofc and made aware that H&P needed within 30 days prior to CT Procedure with Anesthesia. She will notify MD, and will fax to [facility]

## 2021-07-22 ENCOUNTER — LAB ENCOUNTER (OUTPATIENT)
Dept: LAB | Age: 41
End: 2021-07-22
Attending: FAMILY MEDICINE
Payer: COMMERCIAL

## 2021-07-22 DIAGNOSIS — Z01.818 PRE-OP TESTING: ICD-10-CM

## 2021-07-22 LAB — SARS-COV-2 RNA RESP QL NAA+PROBE: NOT DETECTED

## 2021-07-22 NOTE — PAT NURSING NOTE
S/w Yanique Rasheed from Dr Claudia Huynh of[facility] and f/u H&P still needed before CT procedure with anesthesia  tomorrow.  She will notify MD.

## 2021-07-23 ENCOUNTER — HOSPITAL ENCOUNTER (OUTPATIENT)
Dept: CT IMAGING | Facility: HOSPITAL | Age: 41
Discharge: HOME | End: 2021-07-23
Attending: FAMILY MEDICINE
Payer: COMMERCIAL

## 2021-07-23 DIAGNOSIS — Z01.818 PRE-OP TESTING: Primary | ICD-10-CM

## 2021-08-10 ENCOUNTER — HOSPITAL ENCOUNTER (EMERGENCY)
Facility: HOSPITAL | Age: 41
Discharge: HOME | End: 2021-08-10
Attending: EMERGENCY MEDICINE
Payer: COMMERCIAL

## 2021-08-10 ENCOUNTER — APPOINTMENT (OUTPATIENT)
Dept: CT IMAGING | Facility: HOSPITAL | Age: 41
End: 2021-08-10
Attending: EMERGENCY MEDICINE
Payer: COMMERCIAL

## 2021-08-10 VITALS
OXYGEN SATURATION: 98 % | HEART RATE: 81 BPM | WEIGHT: 215 LBS | RESPIRATION RATE: 16 BRPM | HEIGHT: 64 IN | SYSTOLIC BLOOD PRESSURE: 140 MMHG | TEMPERATURE: 98 F | DIASTOLIC BLOOD PRESSURE: 96 MMHG | BODY MASS INDEX: 36.7 KG/M2

## 2021-08-10 DIAGNOSIS — K80.50 BILIARY COLIC: Primary | ICD-10-CM

## 2021-08-10 LAB
ALBUMIN SERPL-MCNC: 3.2 G/DL (ref 3.4–5)
ALP LIVER SERPL-CCNC: 74 U/L
ALT SERPL-CCNC: 30 U/L
ANION GAP SERPL CALC-SCNC: 10 MMOL/L (ref 0–18)
AST SERPL-CCNC: 20 U/L (ref 15–37)
B-HCG UR QL: NEGATIVE
BASOPHILS # BLD AUTO: 0.03 X10(3) UL (ref 0–0.2)
BASOPHILS NFR BLD AUTO: 0.3 %
BILIRUB DIRECT SERPL-MCNC: <0.1 MG/DL (ref 0–0.2)
BILIRUB SERPL-MCNC: 0.2 MG/DL (ref 0.1–2)
BILIRUB UR QL: NEGATIVE
BUN BLD-MCNC: 13 MG/DL (ref 7–18)
BUN/CREAT SERPL: 17.6 (ref 10–20)
CALCIUM BLD-MCNC: 8.6 MG/DL (ref 8.5–10.1)
CHLORIDE SERPL-SCNC: 104 MMOL/L (ref 98–112)
CLARITY UR: CLEAR
CO2 SERPL-SCNC: 27 MMOL/L (ref 21–32)
COLOR UR: YELLOW
CREAT BLD-MCNC: 0.74 MG/DL
DEPRECATED RDW RBC AUTO: 48.6 FL (ref 35.1–46.3)
EOSINOPHIL # BLD AUTO: 0.1 X10(3) UL (ref 0–0.7)
EOSINOPHIL NFR BLD AUTO: 1.1 %
ERYTHROCYTE [DISTWIDTH] IN BLOOD BY AUTOMATED COUNT: 15.7 % (ref 11–15)
GLUCOSE BLD-MCNC: 112 MG/DL (ref 70–99)
GLUCOSE UR-MCNC: NEGATIVE MG/DL
HCT VFR BLD AUTO: 36.1 %
HGB BLD-MCNC: 11.1 G/DL
IMM GRANULOCYTES # BLD AUTO: 0.02 X10(3) UL (ref 0–1)
IMM GRANULOCYTES NFR BLD: 0.2 %
KETONES UR-MCNC: NEGATIVE MG/DL
LEUKOCYTE ESTERASE UR QL STRIP.AUTO: NEGATIVE
LIPASE SERPL-CCNC: 152 U/L (ref 73–393)
LYMPHOCYTES # BLD AUTO: 1.49 X10(3) UL (ref 1–4)
LYMPHOCYTES NFR BLD AUTO: 17 %
M PROTEIN MFR SERPL ELPH: 7.5 G/DL (ref 6.4–8.2)
MCH RBC QN AUTO: 26.3 PG (ref 26–34)
MCHC RBC AUTO-ENTMCNC: 30.7 G/DL (ref 31–37)
MCV RBC AUTO: 85.5 FL
MONOCYTES # BLD AUTO: 0.6 X10(3) UL (ref 0.1–1)
MONOCYTES NFR BLD AUTO: 6.8 %
NEUTROPHILS # BLD AUTO: 6.53 X10 (3) UL (ref 1.5–7.7)
NEUTROPHILS # BLD AUTO: 6.53 X10(3) UL (ref 1.5–7.7)
NEUTROPHILS NFR BLD AUTO: 74.6 %
NITRITE UR QL STRIP.AUTO: NEGATIVE
OSMOLALITY SERPL CALC.SUM OF ELEC: 293 MOSM/KG (ref 275–295)
PH UR: 6 [PH] (ref 5–8)
PLATELET # BLD AUTO: 328 10(3)UL (ref 150–450)
POTASSIUM SERPL-SCNC: 3.6 MMOL/L (ref 3.5–5.1)
PROT UR-MCNC: NEGATIVE MG/DL
RBC # BLD AUTO: 4.22 X10(6)UL
SODIUM SERPL-SCNC: 141 MMOL/L (ref 136–145)
SP GR UR STRIP: 1.02 (ref 1–1.03)
UROBILINOGEN UR STRIP-ACNC: <2
WBC # BLD AUTO: 8.8 X10(3) UL (ref 4–11)

## 2021-08-10 PROCEDURE — 80076 HEPATIC FUNCTION PANEL: CPT | Performed by: EMERGENCY MEDICINE

## 2021-08-10 PROCEDURE — S0028 INJECTION, FAMOTIDINE, 20 MG: HCPCS | Performed by: EMERGENCY MEDICINE

## 2021-08-10 PROCEDURE — 80048 BASIC METABOLIC PNL TOTAL CA: CPT | Performed by: EMERGENCY MEDICINE

## 2021-08-10 PROCEDURE — 96374 THER/PROPH/DIAG INJ IV PUSH: CPT

## 2021-08-10 PROCEDURE — 96375 TX/PRO/DX INJ NEW DRUG ADDON: CPT

## 2021-08-10 PROCEDURE — 93010 ELECTROCARDIOGRAM REPORT: CPT | Performed by: EMERGENCY MEDICINE

## 2021-08-10 PROCEDURE — 99284 EMERGENCY DEPT VISIT MOD MDM: CPT

## 2021-08-10 PROCEDURE — 85025 COMPLETE CBC W/AUTO DIFF WBC: CPT | Performed by: EMERGENCY MEDICINE

## 2021-08-10 PROCEDURE — 93005 ELECTROCARDIOGRAM TRACING: CPT

## 2021-08-10 PROCEDURE — 74176 CT ABD & PELVIS W/O CONTRAST: CPT | Performed by: EMERGENCY MEDICINE

## 2021-08-10 PROCEDURE — 81025 URINE PREGNANCY TEST: CPT

## 2021-08-10 PROCEDURE — 81001 URINALYSIS AUTO W/SCOPE: CPT | Performed by: EMERGENCY MEDICINE

## 2021-08-10 PROCEDURE — 83690 ASSAY OF LIPASE: CPT | Performed by: EMERGENCY MEDICINE

## 2021-08-10 RX ORDER — FAMOTIDINE 10 MG/ML
20 INJECTION, SOLUTION INTRAVENOUS ONCE
Status: COMPLETED | OUTPATIENT
Start: 2021-08-10 | End: 2021-08-10

## 2021-08-10 RX ORDER — ONDANSETRON 4 MG/1
4 TABLET, ORALLY DISINTEGRATING ORAL EVERY 4 HOURS PRN
Qty: 10 TABLET | Refills: 0 | Status: SHIPPED | OUTPATIENT
Start: 2021-08-10 | End: 2021-08-17

## 2021-08-10 RX ORDER — ONDANSETRON 2 MG/ML
4 INJECTION INTRAMUSCULAR; INTRAVENOUS ONCE
Status: COMPLETED | OUTPATIENT
Start: 2021-08-10 | End: 2021-08-10

## 2021-08-10 NOTE — ED INITIAL ASSESSMENT (HPI)
Patient presents to ER with complaints of mid abdominal/epigastric pain/ back pain that began about 9pm tonight. Patient also notes nausea, and vomiting. Patient uncomfortable in triage.

## 2021-08-10 NOTE — ED PROVIDER NOTES
Patient Seen in: [facility] Emergency Department      History   Patient presents with:  Abdomen/Flank Pain  Back Pain  Nausea/Vomiting/Diarrhea    Stated Complaint: abdominal pain and back pain     HPI/Subjective:   HPI  Patient is a 65-year-old f 4\")   Wt 97.5 kg   LMP 08/05/2021   SpO2 98%   BMI 36.90 kg/m²         Physical Exam  Vitals and nursing note reviewed. Constitutional:       General: She is not in acute distress. Appearance: Normal appearance. She is not toxic-appearing.    HENT: 11.1 (*)     MCHC 30.7 (*)     RDW-SD 48.6 (*)     RDW 15.7 (*)     All other components within normal limits   LIPASE - Normal   POCT PREGNANCY URINE - Normal   CBC WITH DIFFERENTIAL WITH PLATELET    Narrative:      The following orders were created for pa elective cholecystectomy if needed.                              Disposition and Plan     Clinical Impression:  Biliary colic  (primary encounter diagnosis)     Disposition:  Discharge  8/10/2021  4:58 am    Follow-up:  Deejay Terry MD  [place] [address]

## 2021-08-13 PROBLEM — K80.12 CALCULUS OF GALLBLADDER WITH ACUTE ON CHRONIC CHOLECYSTITIS WITHOUT OBSTRUCTION: Status: ACTIVE | Noted: 2021-08-13

## 2022-07-03 ENCOUNTER — HOSPITAL ENCOUNTER (OUTPATIENT)
Dept: ULTRASOUND IMAGING | Age: 42
End: 2022-07-03
Attending: INTERNAL MEDICINE
Payer: COMMERCIAL

## 2022-07-03 ENCOUNTER — HOSPITAL ENCOUNTER (OUTPATIENT)
Dept: ULTRASOUND IMAGING | Age: 42
Discharge: HOME | End: 2022-07-03
Attending: INTERNAL MEDICINE
Payer: COMMERCIAL

## 2022-07-03 DIAGNOSIS — R19.5 ACHOLIC STOOL: ICD-10-CM

## 2022-07-03 PROCEDURE — 76700 US EXAM ABDOM COMPLETE: CPT | Performed by: INTERNAL MEDICINE

## 2022-07-08 ENCOUNTER — LAB ENCOUNTER (OUTPATIENT)
Dept: LAB | Age: 42
End: 2022-07-08
Attending: INTERNAL MEDICINE
Payer: COMMERCIAL

## 2022-07-08 DIAGNOSIS — R19.5 ACHOLIC STOOL: ICD-10-CM

## 2022-07-08 DIAGNOSIS — K76.0 NAFLD (NONALCOHOLIC FATTY LIVER DISEASE): ICD-10-CM

## 2022-07-08 DIAGNOSIS — R19.4 CHANGE IN BOWEL HABITS: ICD-10-CM

## 2022-07-08 LAB
ALBUMIN SERPL-MCNC: 3.6 G/DL (ref 3.4–5)
ALBUMIN/GLOB SERPL: 1 {RATIO} (ref 1–2)
ALP LIVER SERPL-CCNC: 73 U/L
ALT SERPL-CCNC: 25 U/L
ANION GAP SERPL CALC-SCNC: 7 MMOL/L (ref 0–18)
AST SERPL-CCNC: 28 U/L (ref 15–37)
BASOPHILS # BLD AUTO: 0.02 X10(3) UL (ref 0–0.2)
BASOPHILS NFR BLD AUTO: 0.3 %
BILIRUB SERPL-MCNC: 0.4 MG/DL (ref 0.1–2)
BUN BLD-MCNC: 17 MG/DL (ref 7–18)
CALCIUM BLD-MCNC: 8.8 MG/DL (ref 8.5–10.1)
CHLORIDE SERPL-SCNC: 107 MMOL/L (ref 98–112)
CO2 SERPL-SCNC: 27 MMOL/L (ref 21–32)
CREAT BLD-MCNC: 0.65 MG/DL
EOSINOPHIL # BLD AUTO: 0.06 X10(3) UL (ref 0–0.7)
EOSINOPHIL NFR BLD AUTO: 1 %
ERYTHROCYTE [DISTWIDTH] IN BLOOD BY AUTOMATED COUNT: 12.9 %
FASTING STATUS PATIENT QL REPORTED: YES
GLOBULIN PLAS-MCNC: 3.5 G/DL (ref 2.8–4.4)
GLUCOSE BLD-MCNC: 106 MG/DL (ref 70–99)
HAV AB SER QL IA: NONREACTIVE
HBV SURFACE AB SER QL: NONREACTIVE
HBV SURFACE AB SERPL IA-ACNC: <3.1 MIU/ML
HCT VFR BLD AUTO: 39.4 %
HGB BLD-MCNC: 12.5 G/DL
IMM GRANULOCYTES # BLD AUTO: 0.01 X10(3) UL (ref 0–1)
IMM GRANULOCYTES NFR BLD: 0.2 %
LYMPHOCYTES # BLD AUTO: 1.46 X10(3) UL (ref 1–4)
LYMPHOCYTES NFR BLD AUTO: 24.1 %
MCH RBC QN AUTO: 30.7 PG (ref 26–34)
MCHC RBC AUTO-ENTMCNC: 31.7 G/DL (ref 31–37)
MCV RBC AUTO: 96.8 FL
MONOCYTES # BLD AUTO: 0.59 X10(3) UL (ref 0.1–1)
MONOCYTES NFR BLD AUTO: 9.7 %
NEUTROPHILS # BLD AUTO: 3.93 X10 (3) UL (ref 1.5–7.7)
NEUTROPHILS # BLD AUTO: 3.93 X10(3) UL (ref 1.5–7.7)
NEUTROPHILS NFR BLD AUTO: 64.7 %
OSMOLALITY SERPL CALC.SUM OF ELEC: 294 MOSM/KG (ref 275–295)
PLATELET # BLD AUTO: 297 10(3)UL (ref 150–450)
POTASSIUM SERPL-SCNC: 3.6 MMOL/L (ref 3.5–5.1)
PROT SERPL-MCNC: 7.1 G/DL (ref 6.4–8.2)
RBC # BLD AUTO: 4.07 X10(6)UL
SODIUM SERPL-SCNC: 141 MMOL/L (ref 136–145)
TSI SER-ACNC: 0.54 MIU/ML (ref 0.36–3.74)
WBC # BLD AUTO: 6.1 X10(3) UL (ref 4–11)

## 2022-07-08 PROCEDURE — 80053 COMPREHEN METABOLIC PANEL: CPT

## 2022-07-08 PROCEDURE — 36415 COLL VENOUS BLD VENIPUNCTURE: CPT

## 2022-07-08 PROCEDURE — 85025 COMPLETE CBC W/AUTO DIFF WBC: CPT

## 2022-07-08 PROCEDURE — 84443 ASSAY THYROID STIM HORMONE: CPT

## 2022-07-08 PROCEDURE — 86706 HEP B SURFACE ANTIBODY: CPT

## 2022-07-08 PROCEDURE — 86708 HEPATITIS A ANTIBODY: CPT

## 2024-02-17 ENCOUNTER — HOSPITAL ENCOUNTER (OUTPATIENT)
Dept: GENERAL RADIOLOGY | Facility: HOSPITAL | Age: 44
Discharge: HOME | End: 2024-02-17
Attending: FAMILY MEDICINE
Payer: COMMERCIAL

## 2024-02-17 ENCOUNTER — HOSPITAL ENCOUNTER (OUTPATIENT)
Dept: CT IMAGING | Facility: HOSPITAL | Age: 44
Discharge: HOME | End: 2024-02-17
Attending: FAMILY MEDICINE
Payer: COMMERCIAL

## 2024-02-17 DIAGNOSIS — N23 RENAL COLIC ON RIGHT SIDE: ICD-10-CM

## 2024-02-17 DIAGNOSIS — M77.8 ENTHESOPATHY OF ELBOW, UNSPECIFIED: ICD-10-CM

## 2024-02-17 PROCEDURE — 74176 CT ABD & PELVIS W/O CONTRAST: CPT | Performed by: FAMILY MEDICINE

## 2024-02-17 PROCEDURE — 73030 X-RAY EXAM OF SHOULDER: CPT | Performed by: FAMILY MEDICINE

## 2024-02-20 ENCOUNTER — TELEPHONE (OUTPATIENT)
Dept: ORTHOPEDICS CLINIC | Facility: CLINIC | Age: 44
End: 2024-02-20

## 2024-02-20 DIAGNOSIS — M25.522 LEFT ELBOW PAIN: Primary | ICD-10-CM

## 2024-02-20 NOTE — TELEPHONE ENCOUNTER
XR ordered per ortho protocol by nurse. XR scheduled and patient was notified via MyChart by nurse to let them know that they should arrive 15-20 minutes early, in order for them to complete imaging.

## 2024-02-20 NOTE — TELEPHONE ENCOUNTER
Future Appointments   Date Time Provider Department Center   2/27/2024  8:30 AM Cedrick Wright, DO [facility]       This patient is coming for LT Elbow possible tendonitis. There was recent imaging done in epic. Please advise if additional views are needed for this appt. Thanks.      Patient may be reached at [phone]

## 2024-02-23 NOTE — TELEPHONE ENCOUNTER
Patient has X-Rays of left shoulder and CT of left abdomen from 02/17/24, but not imaging of the left elbow. Patient was asked what they were coming in for, due to patient saying the imaging should be in Epic, and no left elbow X-Ray being in Epic.

## 2024-02-23 NOTE — TELEPHONE ENCOUNTER
Patient stated that it is their left shoulder and not their left elbow. No additional imaging needed. Thank you!

## 2024-02-27 ENCOUNTER — OFFICE VISIT (OUTPATIENT)
Dept: ORTHOPEDICS CLINIC | Facility: CLINIC | Age: 44
End: 2024-02-27
Payer: COMMERCIAL

## 2024-02-27 VITALS — WEIGHT: 199 LBS | HEIGHT: 64 IN | BODY MASS INDEX: 33.97 KG/M2

## 2024-02-27 DIAGNOSIS — M19.019 AC JOINT ARTHROPATHY: ICD-10-CM

## 2024-02-27 DIAGNOSIS — M75.32 CALCIFIC TENDINITIS OF LEFT SHOULDER: ICD-10-CM

## 2024-02-27 DIAGNOSIS — M75.22 BICEPS TENDINITIS OF LEFT SHOULDER: Primary | ICD-10-CM

## 2024-02-27 PROCEDURE — 20550 NJX 1 TENDON SHEATH/LIGAMENT: CPT | Performed by: FAMILY MEDICINE

## 2024-02-27 PROCEDURE — 76942 ECHO GUIDE FOR BIOPSY: CPT | Performed by: FAMILY MEDICINE

## 2024-02-27 PROCEDURE — 99204 OFFICE O/P NEW MOD 45 MIN: CPT | Performed by: FAMILY MEDICINE

## 2024-02-27 PROCEDURE — 3008F BODY MASS INDEX DOCD: CPT | Performed by: FAMILY MEDICINE

## 2024-02-27 RX ORDER — KETOROLAC TROMETHAMINE 30 MG/ML
30 INJECTION, SOLUTION INTRAMUSCULAR; INTRAVENOUS ONCE
Status: COMPLETED | OUTPATIENT
Start: 2024-02-27 | End: 2024-02-27

## 2024-02-27 RX ORDER — DICLOFENAC SODIUM 75 MG/1
75 TABLET, DELAYED RELEASE ORAL 2 TIMES DAILY
Qty: 28 TABLET | Refills: 1 | Status: SHIPPED | OUTPATIENT
Start: 2024-02-27

## 2024-02-27 RX ORDER — TRIAMCINOLONE ACETONIDE 40 MG/ML
40 INJECTION, SUSPENSION INTRA-ARTICULAR; INTRAMUSCULAR ONCE
Status: COMPLETED | OUTPATIENT
Start: 2024-02-27 | End: 2024-02-27

## 2024-02-27 RX ADMIN — TRIAMCINOLONE ACETONIDE 40 MG: 40 INJECTION, SUSPENSION INTRA-ARTICULAR; INTRAMUSCULAR at 08:53:00

## 2024-02-27 RX ADMIN — KETOROLAC TROMETHAMINE 30 MG: 30 INJECTION, SOLUTION INTRAMUSCULAR; INTRAVENOUS at 08:53:00

## 2024-02-27 NOTE — H&P
Sports Medicine Clinic Note    Subjective:     Chief Complaint   Patient presents with    Shoulder Pain     Lt shoulder pain  onset: 10.23  - constant pain at the front of the shoulder, painful to reach forward or to shrug   - no previous injury  - dominant hand: rt hand      HPI: This is a pleasant 43 year old RHD female presenting with complaints of persistent left shoulder pain for the past several months. 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 The pain is localized to the anterior aspect of the shoulder mainly and radiates slightly down the arm. Reports limited range of motion and decreased strength, which affects his daily activities. The patient denies any prior injury or trauma to the shoulder. No associated symptoms such as numbness or tingling. Patient tried PO NSAIDs and muscle relaxers with minimal relief.    Objective:     Physical Exam    Ht 5' 4\" (1.626 m)   Wt 199 lb (90.3 kg)   BMI 34.16 kg/m²     Constitutional: NAD. AOx3. Well-developed and Well-nourished.   Psychiatric: Normal mood/ affect/ behavior. Judgment and thought content normal.    Focused Musculoskeletal Exam of Left Shoulder    Inspection  - No gross asymmetry / loss of contour / gross deformity  - No evidence of muscle atrophy    Palpation  - Tenderness noted at the subacromial area, no bony tenderness.  - No tenderness to palpation at AC joint, biceps tendon    ROM  - AROM: FF 0-160, Abduction 0-160, External rotation 50, Internal rotation to mid thoracic back  - PROM: FF 0-160, Abduction 0-160, External rotation 50, Internal rotation to mid thoracic back    Neurovascular  - SILT axillary / radial / ulnar / median / musculocutaneous nerve distributions  - Fires biceps / triceps / deltoid  - 2+ radial pulse, brisk capillary refill    Special Tests  - Negative Neer  (impingement)  - Negative Laboy (impingement)  - Positive Empty Can (supraspinatus)  - Negative Lift Off (subscapularis)  - Negative ER Lag (infraspinatous/teres minor)  - Negative Hornblowers (teres minor)  - Positive Yergason (biceps)  - Negative O’lito’s (labrum)  - Negative Apprehension (labrum)  - Negative Spurling (cervical radiculopathy)  - Positive cross arm test  - Positive Speed's test    Imaging    Radiographs of the left shoulder: Personally reviewed in office and no apparent fracture or dislocation is evident. Evidence of mild calcific tendinitis at rotator cuff insertion, DJD of the AC joint    Assessment & Plan:     43 year old female with clinical history and examination consistent with    Bicep tendinopathy, left shoulder  AC joint arthropathy, left  Rotator cuff syndrome/calcific tendinopathy, left    The patient will be treated with the following:    Prescribe Diclofenac 75mg BID for pain control and inflammation. Physical therapy referral provided for strengthening and range of motion exercises tailored to rotator cuff syndrome/biceps tendinopathy. Suggest using an over-the-counter shoulder brace for additional support. Given the chronicity of symptoms, a corticosteroid injection was offered and the patient opted for this today. Will target the long head bicep under US today as this is her primary pain generator. Consider other injections as well at later date as clinically she does have pain at the AC joint and consistent with rotator cuff syndrome. Recommend avoidance of heavy lifting and overhead activities for the time being. Tentative follow-up in 2-3 weeks to assess treatment response.    Ultrasound Guided Procedure Note:    After discussion of the risks and benefits, the patient elected to proceed with a therapeutic injection into the left long head bicep tendon sheath under US guidance. Confirmed that the patient does not have history of prior adverse reactions, active infections, or  relevant allergies. There was no erythema or warmth, and the skin was clear.    The skin was sterilized with ChloraPrep. A 22 gauge needle was inserted via inferolateral approach utilizing US for needle guidance and placement. The site was injected with a mixture of 1 mL of kenalog 40 mg/mL, 1 mL of Toradol 30 mg/mL and 1 mL of 1% Lidocaine without Epinephrine. The injection was completed without complication, and a bandage was applied.    The patient tolerated the procedure well and was instructed to avoid strenuous activity for the next 24-48 hours and to use ice or Tylenol for pain as needed. The patient will call immediately with any signs of infection or allergic reaction.    Post-Injection Care: The patient tolerated the procedure well. An occlusive bandage was placed over the injection site. Post-injection care instructions provided to the patient. The patient was asked to avoid strenuous activity and continue to rest the area for 2-3 days before resuming regular activities. Patient advised that the area may be more painful for the first 1-2 days. They can use ice or Tylenol for pain as needed.  Patient was instructed to watch for fever, increased swelling, or persistent pain. The patient will call immediately with any signs of infection or allergic reaction.    Complications: The patient tolerated the procedure well without any complications.    Cedrick Wright DO, CAWES   Primary Care Sports Medicine    Department of Orthopaedic Surgery  [address] [address]   [address] [place] [id number]    t: [phone]  f: [phone]      [email or web address]

## 2024-03-23 ENCOUNTER — HOSPITAL ENCOUNTER (OUTPATIENT)
Dept: ULTRASOUND IMAGING | Age: 44
Discharge: HOME | End: 2024-03-23
Attending: FAMILY MEDICINE
Payer: COMMERCIAL

## 2024-03-23 DIAGNOSIS — R10.33 UMBILICAL PAIN: ICD-10-CM

## 2024-03-23 PROCEDURE — 76700 US EXAM ABDOM COMPLETE: CPT | Performed by: FAMILY MEDICINE

## 2024-03-29 ENCOUNTER — OFFICE VISIT (OUTPATIENT)
Dept: ORTHOPEDICS CLINIC | Facility: CLINIC | Age: 44
End: 2024-03-29
Payer: COMMERCIAL

## 2024-03-29 DIAGNOSIS — M24.9 DERANGEMENT OF LEFT SHOULDER JOINT: Primary | ICD-10-CM

## 2024-03-29 DIAGNOSIS — M19.019 AC JOINT ARTHROPATHY: ICD-10-CM

## 2024-03-29 DIAGNOSIS — M75.32 CALCIFIC TENDINITIS OF LEFT SHOULDER: ICD-10-CM

## 2024-03-29 DIAGNOSIS — M75.22 BICEPS TENDINITIS OF LEFT SHOULDER: ICD-10-CM

## 2024-03-29 PROCEDURE — 99214 OFFICE O/P EST MOD 30 MIN: CPT | Performed by: FAMILY MEDICINE

## 2024-03-29 RX ORDER — DICLOFENAC SODIUM 75 MG/1
75 TABLET, DELAYED RELEASE ORAL 2 TIMES DAILY
Qty: 28 TABLET | Refills: 1 | Status: SHIPPED | OUTPATIENT
Start: 2024-03-29

## 2024-03-29 RX ORDER — CYCLOBENZAPRINE HCL 10 MG
10 TABLET ORAL NIGHTLY
Qty: 20 TABLET | Refills: 0 | Status: SHIPPED | OUTPATIENT
Start: 2024-03-29 | End: 2024-04-18

## 2024-03-29 NOTE — PROGRESS NOTES
Sports Medicine Clinic Note     Subjective:    Chief Complaint: Persistent left shoulder pain following steroid injection.    Interval History: The patient is a 43-year-old right-hand-dominant female returning for follow-up after receiving a steroid injection in the left shoulder for biceps tendinopathy. Since the last visit, she reports an initial improvement in pain levels but continues to experience discomfort, particularly when holding objects. The patient has been compliant with at-home exercises prescribed previously. Despite the partial relief post-injection, the patient expresses a desire to defer further injections and requests an MRI to further investigate the underlying pathology, aiming to guide future treatment decisions.    Objective:    Left Shoulder Examination:    Inspection:  - No changes in symmetry or contour noted from the previous visit.  - Absence of muscle atrophy or gross deformities.    Palpation:  - Improved tenderness localized to the biceps tendon area.  - Tenderness over the AC joint and greater tuberosity.    Range of Motion:  - Active and passive range of motion remains unchanged; Forward flexion and abduction up to 160 degrees, external rotation at 50 degrees, and internal rotation to mid-thoracic back.    Neurovascular:  - No changes in neurovascular examination; intact sensation over axillary, radial, ulnar, median, and musculocutaneous nerve distributions.   - Strength preserved in biceps, triceps, and deltoid muscles.   - Radial pulse 2+ with brisk capillary refill.    Special Tests:  - Improvement with Yergason and Speed's tests, indicating biceps tendon pathology.  - Remaining shoulder special tests (Neer, Laboy, Empty Can, Lift Off, ER Lag, Hornblowers, O’lito’s, Apprehension, Spurling, Cross Arm Test) unchanged and consistent with previous findings.    Diagnostic Tests:  - No new imaging    Assessment:    - Biceps Tendinopathy, left shoulder, partial improvement in  symptoms with steroid injection.  - AC joint arthropathy, left, unchanged.  - Rotator cuff syndrome/calcific tendinitis, left, unchanged.    Plan:    Imaging: Order MRI of the left shoulder to evaluate the extent of biceps tendon involvement and assess for any additional pathology not previously identified.    Therapy: Continue home exercise therapy focusing on strengthening and range of motion exercises, specifically tailored to address biceps tendinopathy and rotator cuff health.    Medications: Continue Diclofenac 75mg BID for pain and inflammation control. Patient may use Acetaminophen as needed for breakthrough pain.    Activity Recommendations: Patient is advised to maintain avoidance of heavy lifting and overhead activities to prevent exacerbation of symptoms.    Follow-Up: The patient is scheduled to return for MRI review and further treatment planning or sooner if symptoms escalate.    Cedrick Wright DO, ESTELAM  Primary Care Sports Medicine    Department of Orthopaedic Surgery  [address] [address]  [address] [place] [address]    t: [phone]  f: [phone]      [facility]

## 2024-04-08 ENCOUNTER — HOSPITAL ENCOUNTER (OUTPATIENT)
Age: 44
Discharge: HOME | End: 2024-04-08
Payer: COMMERCIAL

## 2024-04-08 VITALS
OXYGEN SATURATION: 100 % | DIASTOLIC BLOOD PRESSURE: 98 MMHG | TEMPERATURE: 98 F | HEART RATE: 94 BPM | RESPIRATION RATE: 20 BRPM | SYSTOLIC BLOOD PRESSURE: 136 MMHG

## 2024-04-08 DIAGNOSIS — H66.92 LEFT ACUTE OTITIS MEDIA: Primary | ICD-10-CM

## 2024-04-08 DIAGNOSIS — J02.9 ACUTE PHARYNGITIS, UNSPECIFIED ETIOLOGY: ICD-10-CM

## 2024-04-08 DIAGNOSIS — R03.0 ELEVATED BLOOD PRESSURE READING: ICD-10-CM

## 2024-04-08 LAB — S PYO AG THROAT QL: NEGATIVE

## 2024-04-08 RX ORDER — AMOXICILLIN AND CLAVULANATE POTASSIUM 875; 125 MG/1; MG/1
1 TABLET, FILM COATED ORAL 2 TIMES DAILY
Qty: 20 TABLET | Refills: 0 | Status: SHIPPED | OUTPATIENT
Start: 2024-04-08 | End: 2024-04-18

## 2024-04-08 RX ORDER — IBUPROFEN 600 MG/1
TABLET ORAL
Qty: 20 TABLET | Refills: 0 | Status: SHIPPED | OUTPATIENT
Start: 2024-04-08

## 2024-04-08 NOTE — ED PROVIDER NOTES
Patient Seen in: [facility]    History     Chief Complaint   Patient presents with    Cough/URI     Stated Complaint: SORE THROAT, TONSILS SWOLLEN    HPI    Kate Frost is a 43 year old female presents with chief complaint of sore throat.  Onset ***.  Patient reports associated ***.  Patient denies sick contacts.  Pain scale *** out of 10.  Pain described as aching.  Patient states that pain worsens upon swallowing.  Patient denies alleviating factors.  Patient states the pain is intermittent.  Patient states they are tolerating solid food and oral liquids.  Patient denies fever, chills, earache, trismus, drooling, neck pain, restricted neck movement, neck swelling, rash, cough, dyspnea, wheeze, abdominal pain, nausea, vomiting, diarrhea, constipation.      Past Medical History:   Diagnosis Date    Abdominal pain 04/22/22    Anemia     Back pain 04/22/22    Belching 06/09/22    Bloating 06/09/22    Chest pain 04/22/22    Constipation 06/09/22    Decorative tattoo 08/2000    Diarrhea, unspecified 06/09/22    Flatulence/gas pain/belching 06/09/22    Frequent use of laxatives 06/09/22    Headache disorder 04/22/22    Heartburn 6/9/22    History of cardiac murmur 06/21/82    Indigestion 06/09/22    Irregular bowel habits 06/09/22    Nausea 6/9/22    Pain with bowel movements 06/09/22    Problems with swallowing 06/09/22    Sleep disturbance 04/22/22    Trichomonosis 06/16/2017    treated     Uncomfortable fullness after meals 04/22/22    Visual impairment     contacts    Vomiting 6/9/22    Wears glasses 07/1992       Past Surgical History:   Procedure Laterality Date    CHOLECYSTECTOMY  08/23/2021    PATIENT DENIES ANY SURGICAL HISTORY      as of 11/14/2018            Family History   Problem Relation Age of Onset    High Blood Pressure Other         in the family     Diabetes Other         in the family     Other (liver Cancer) Mother     Diabetes Mother     Other Mother         Liver cancer    Other  (Stomach Cancer) Father     Prostate Cancer Father     Diabetes Father     Hypertension Father     Other Father         Stomach cancer       Social History     Socioeconomic History    Marital status:     Number of children: 2   Occupational History    Occupation:    Tobacco Use    Smoking status: Former     Packs/day: 0.50     Years: 0.00     Additional pack years: 0.00     Total pack years: 0.00     Types: Cigarettes     Quit date: 2020     Years since quittin.1    Smokeless tobacco: Never    Tobacco comments:     quit 18months ago   Vaping Use    Vaping Use: Some days   Substance and Sexual Activity    Alcohol use: Yes     Alcohol/week: 2.0 standard drinks of alcohol     Types: 1 Glasses of wine, 1 Shots of liquor per week     Comment: social     Drug use: Yes     Frequency: 3.0 times per week     Types: Cannabis    Sexual activity: Yes     Partners: Female   Other Topics Concern    Blood Transfusions No    Caffeine Concern Yes     Comment: occ    Exercise Yes     Comment: 1-2 a week, walking     Seat Belt Yes   Social History Narrative    No history of abuse        Review of Systems    Positive for stated complaint: SORE THROAT, TONSILS SWOLLEN  Other systems are as noted in HPI.  Constitutional and vital signs reviewed.      All other systems reviewed and negative except as noted above.    PSFH elements reviewed from today and agreed except as otherwise stated in HPI.    Physical Exam     ED Triage Vitals [24 1807]   BP (!) 136/98   Pulse 94   Resp 20   Temp 97.5 °F (36.4 °C)   Temp src Temporal   SpO2 100 %   O2 Device None (Room air)       Current:BP (!) 136/98   Pulse 94   Temp 97.5 °F (36.4 °C) (Temporal)   Resp 20   SpO2 100%     PULSE OX within normal limits on room air as interpreted by this provider. ***    Constitutional: The patient is cooperative. Appears well-developed and well-nourished.  No acute distress.  Psychological: Alert, No  abnormalities of mood, affect.  Head: Normocephalic/atraumatic.  Nontender.  Eyes: Pupils are equal round reactive to light.  Conjunctiva are within normal limits.  ENT: Pharynx injected.  Tonsils within normal size limits bilaterally.  No tonsillar exudates.  Uvula midline.  No trismus.  No drooling.  TMs within normal limits bilaterally.  Mucous membranes moist.  Neck: The neck is supple.  Nontender.  No meningeal signs.  Chest: There is no tenderness to the chest wall.  No CVA tenderness bilaterally.  Respiratory: Respiratory effort was normal.  There is no rales, wheezes, or rhonchi.  There is no stridor.  Air entry is equal.  Cardiovascular: Regular rate and rhythm, no murmurs, gallops, rubs.  Capillary refill is brisk.  No extremity edema.  Gastrointestinal: Abdomen soft, nontender, nondistended.  There is no rebound tenderness or guarding.  No organomegaly is noted. No peritoneal signs.  Genitourinary: Not examined.  Lymphatic: No gross lymphadenopathy noted.  Musculoskeletal: Musculoskeletal system is grossly intact.  There is no obvious deformity.  Neurological: Gross motor movement is intact in all 4 extremities.  Patient exhibits normal speech.  Skin: Skin is normal to inspection.  Warm and dry.  No obvious bruising.  No obvious rash.        ED Course     Labs Reviewed   POCT RAPID STREP - Normal       MDM     Differential diagnosis prior to work-up including but not limited to strep pharyngitis, viral pharyngitis, URI    Physical exam remained stable over serial reexaminations as previously documented.  Results reviewed with patient.    I have given the patient instructions regarding their diagnoses, expectations, follow up, and ER precautions. I explained to the patient that emergent conditions may arise and to go to the ER for new, worsening or any persistent conditions. I've explained the importance of following up with their doctor as instructed. The patient verbalized understanding of the discharge  instructions and plan.    The patient was informed of their elevated blood pressure reading at immediate care.  They were informed of the dangers of undiagnosed and untreated hypertension.  Education regarding lifestyle modifications and the need for appropriate follow-up with their PCP to have their blood pressure re-checked within 24-48 hours was provided. ***    Disposition and Plan     Clinical Impression:  1. Left acute otitis media    2. Acute pharyngitis, unspecified etiology    3. Elevated blood pressure reading        Disposition:  Discharge    Follow-up:  Alex Dove MD  [address] [facility] [address]  [phone]    Call in 1 day  For follow-up      Medications Prescribed:  Current Discharge Medication List        START taking these medications    Details   amoxicillin clavulanate 875-125 MG Oral Tab Take 1 tablet by mouth 2 (two) times daily for 10 days.  Qty: 20 tablet, Refills: 0      phenol 1.4 % Mouth/Throat Liquid Use as directed 1 mL in the mouth or throat every 2 (two) hours as needed. For 5 days  Qty: 177 mL, Refills: 0      ibuprofen 600 MG Oral Tab Take 1 tablet (600 mg total) by mouth every 6 hours with food  Qty: 20 tablet, Refills: 0

## 2024-04-08 NOTE — ED INITIAL ASSESSMENT (HPI)
Pt with c/o sore throat since yesterday. + cough, non-productive. Denies congestion, fever. Neck supple.

## 2024-05-03 ENCOUNTER — HOSPITAL ENCOUNTER (OUTPATIENT)
Dept: MRI IMAGING | Facility: HOSPITAL | Age: 44
Discharge: HOME | End: 2024-05-03
Attending: FAMILY MEDICINE
Payer: COMMERCIAL

## 2024-05-03 DIAGNOSIS — M24.9 DERANGEMENT OF LEFT SHOULDER JOINT: ICD-10-CM

## 2024-05-03 PROCEDURE — 73221 MRI JOINT UPR EXTREM W/O DYE: CPT | Performed by: FAMILY MEDICINE

## 2024-08-16 ENCOUNTER — HOSPITAL ENCOUNTER (OUTPATIENT)
Dept: GENERAL RADIOLOGY | Age: 44
Discharge: HOME | End: 2024-08-16
Attending: FAMILY MEDICINE
Payer: COMMERCIAL

## 2024-08-16 DIAGNOSIS — S89.92XA LEFT LEG INJURY, INITIAL ENCOUNTER: ICD-10-CM

## 2024-08-16 PROCEDURE — 73560 X-RAY EXAM OF KNEE 1 OR 2: CPT | Performed by: FAMILY MEDICINE

## 2024-08-22 ENCOUNTER — TELEPHONE (OUTPATIENT)
Dept: ORTHOPEDICS CLINIC | Facility: CLINIC | Age: 44
End: 2024-08-22

## 2024-08-22 NOTE — TELEPHONE ENCOUNTER
Future Appointments   Date Time Provider Department Center   9/6/2024  7:20 AM Krishna Underwood PA-C [facility]   9/19/2024  4:15 PM Akash Blevins DPM [id number]     Please advise if patient needs left knee xrays.

## 2024-09-06 ENCOUNTER — HOSPITAL ENCOUNTER (OUTPATIENT)
Dept: GENERAL RADIOLOGY | Age: 44
Discharge: HOME | End: 2024-09-06
Attending: PHYSICIAN ASSISTANT
Payer: COMMERCIAL

## 2024-09-06 ENCOUNTER — OFFICE VISIT (OUTPATIENT)
Dept: ORTHOPEDICS CLINIC | Facility: CLINIC | Age: 44
End: 2024-09-06
Payer: COMMERCIAL

## 2024-09-06 VITALS — BODY MASS INDEX: 33.97 KG/M2 | HEIGHT: 64 IN | WEIGHT: 199 LBS

## 2024-09-06 DIAGNOSIS — M25.562 ACUTE PAIN OF LEFT KNEE: Primary | ICD-10-CM

## 2024-09-06 DIAGNOSIS — M25.562 LEFT KNEE PAIN, UNSPECIFIED CHRONICITY: ICD-10-CM

## 2024-09-06 PROCEDURE — 3008F BODY MASS INDEX DOCD: CPT | Performed by: PHYSICIAN ASSISTANT

## 2024-09-06 PROCEDURE — 73564 X-RAY EXAM KNEE 4 OR MORE: CPT | Performed by: PHYSICIAN ASSISTANT

## 2024-09-06 PROCEDURE — 99213 OFFICE O/P EST LOW 20 MIN: CPT | Performed by: PHYSICIAN ASSISTANT

## 2024-09-06 NOTE — H&P
[facility] New Patient Note    CC:   Chief Complaint   Patient presents with    Knee Pain     Left knee pain;   DOI: August 1, 2024  *fell back then forward and landed on both knees (to concrete)   *Right has a bump but no pain, left still locks and is painful;  Pain Score: 0 @rest, 6-7 @peak      HPI: This 44 year old female presents today with complaints of left knee pain.  Patient reports that on 8/1/2024 while moving boxes into a moving truck, she tripped over something behind her, causing her to stumble forward and landing directly onto the anterior aspect of both knees.  The right knee has been painless but the left knee has endured persistent medial knee pain since the fall.  She also has noticed patellofemoral crepitus which was not present prior to the fall.  She has not noticed much swelling of the knee.  No bruising around the knee noted.  Symptoms worsen when first getting up and walking and alleviate over time.  She takes ibuprofen very sparingly for pain control.  She has also been applying Biofreeze around the medial knee.    Past Medical History:    Abdominal pain    Anemia    Back pain    Belching    Bloating    Chest pain    Constipation    Decorative tattoo    Diarrhea, unspecified    Flatulence/gas pain/belching    Frequent use of laxatives    Headache disorder    Heartburn    History of cardiac murmur    Indigestion    Irregular bowel habits    Nausea    Pain with bowel movements    Problems with swallowing    Sleep disturbance    Trichomonosis    treated     Uncomfortable fullness after meals    Visual impairment    contacts    Vomiting    Wears glasses     Past Surgical History:   Procedure Laterality Date    Cholecystectomy  08/23/2021    Patient denies any surgical history      as of 11/14/2018     Current Outpatient Medications   Medication Sig Dispense Refill    ibuprofen 600 MG Oral Tab Take 1 tablet (600 mg total) by mouth every 6 hours with food 20 tablet 0    phenol 1.4 %  Mouth/Throat Liquid Use as directed 1 mL in the mouth or throat every 2 (two) hours as needed. For 5 days 177 mL 0    diclofenac 75 MG Oral Tab EC Take 1 tablet (75 mg total) by mouth 2 (two) times daily. (Patient not taking: Reported on 2024) 28 tablet 1    diclofenac 75 MG Oral Tab EC Take 1 tablet (75 mg total) by mouth 2 (two) times daily. (Patient not taking: Reported on 2024) 28 tablet 1     Allergies   Allergen Reactions    Radiology Contrast Iodinated Dyes HIVES     Patient reported that her 2nd exposure did NOT cause a reaction    Sulfa Antibiotics HIVES, ANAPHYLAXIS and OTHER (SEE COMMENTS)     Family History   Problem Relation Age of Onset    High Blood Pressure Other         in the family     Diabetes Other         in the family     Other (liver Cancer) Mother     Diabetes Mother     Other Mother         Liver cancer    Other (Stomach Cancer) Father     Prostate Cancer Father     Diabetes Father     Hypertension Father     Other Father         Stomach cancer     Social History     Occupational History    Occupation:    Tobacco Use    Smoking status: Former     Current packs/day: 0.00     Types: Cigarettes     Start date: 2020     Quit date: 2020     Years since quittin.5    Smokeless tobacco: Never    Tobacco comments:     quit 18months ago   Vaping Use    Vaping status: Former   Substance and Sexual Activity    Alcohol use: Not Currently     Alcohol/week: 2.0 standard drinks of alcohol     Types: 1 Glasses of wine, 1 Shots of liquor per week     Comment: social     Drug use: Yes     Frequency: 3.0 times per week     Types: Cannabis    Sexual activity: Yes     Partners: Female        ROS:  Comprehensive system review obtained and negative except as mentioned above    Physical Exam:    Ht 5' 4\" (1.626 m)   Wt 199 lb (90.3 kg)   BMI 34.16 kg/m²   Constitutional: Awake, alert, no distress.  Very pleasant.  Psychological: Appropriate  affect.  Respiratory: Unlabored breathing.  Left lower extremity:  Inspection: skin is intact without any redness or deformity. No appreciable effusion.   Palpation: Tender to palpation along the medial joint line, medial patellar facet.  Range of motion: Knee can extend to 0 and flex to approximately 140 degrees.  Knee is stable to valgus and varus stress at 0 and 30 degrees. No laxity with anterior or posterior drawer.  Pain with Leticia's test when testing the lateral meniscus.  There is no pain or palpable click when testing the medial meniscus.  No pain with valgus maneuver at 0 or 30 degrees.  Neuromuscular: Strength is normal and sensation is intact.  Vascular: Extremities are warm and well-perfused.  Lymph: Unremarkable.    Imaging: Imaging was personally viewed, independently interpreted and radiology report read.  Radiographs of the left knee obtained today demonstrates possible medial compartmental joint space narrowing but otherwise no significant degenerative changes.    Assessment/Plan:  Diagnoses and all orders for this visit:    Acute pain of left knee      Assessment: 44-year-old female with persistent left medial knee pain following a fall    Plan: I explained the patient the various potential etiologies for her symptoms including sprain of the MPFL, medial soft tissue structures of the knee.  Her patella tracks centrally and I do not suspect any torn soft tissue structures necessitating surgical intervention.  I discussed managing pain with topical modalities and continued as needed use of oral NSAIDs.  I also provided the patient with a knee conditioning packet with targeted strengthening exercises to help aid in recovery.  No indication for intra-articular injection at this point in time.  Her questions were sought and answered satisfactorily.  She may follow-up with me on an as-needed basis.  She is happy with the plan will follow as advised.      Krishna Underwood PA-C  [facility]  Orthopedic [facility]    This note was dictated using Dragon software.  While it was briefly proofread prior to completion, some grammatical, spelling, and word choice errors due to dictation may still occur.

## 2024-10-02 ENCOUNTER — OFFICE VISIT (OUTPATIENT)
Dept: PODIATRY CLINIC | Facility: CLINIC | Age: 44
End: 2024-10-02
Payer: COMMERCIAL

## 2024-10-02 ENCOUNTER — TELEPHONE (OUTPATIENT)
Dept: PODIATRY CLINIC | Facility: CLINIC | Age: 44
End: 2024-10-02

## 2024-10-02 DIAGNOSIS — M20.12 HALLUX VALGUS OF LEFT FOOT: Primary | ICD-10-CM

## 2024-10-02 DIAGNOSIS — M21.612 BUNION OF GREAT TOE OF LEFT FOOT: ICD-10-CM

## 2024-10-02 DIAGNOSIS — Q66.212 METATARSUS PRIMUS VARUS OF LEFT FOOT: ICD-10-CM

## 2024-10-02 PROCEDURE — 99203 OFFICE O/P NEW LOW 30 MIN: CPT | Performed by: PODIATRIST

## 2024-10-06 ENCOUNTER — TELEPHONE (OUTPATIENT)
Dept: PODIATRY CLINIC | Facility: CLINIC | Age: 44
End: 2024-10-06

## 2024-10-06 DIAGNOSIS — M20.12 HALLUX VALGUS OF LEFT FOOT: Primary | ICD-10-CM

## 2024-10-06 DIAGNOSIS — Q66.212 METATARSUS PRIMUS VARUS OF LEFT FOOT: ICD-10-CM

## 2024-10-06 DIAGNOSIS — M79.672 FOOT PAIN, LEFT: ICD-10-CM

## 2024-10-06 DIAGNOSIS — M21.612 BUNION OF GREAT TOE OF LEFT FOOT: ICD-10-CM

## 2024-10-06 NOTE — PROGRESS NOTES
Kate Frost is a 44 year old female.   Chief Complaint   Patient presents with    Bunions     Consult Left foot bunion pain 7/10 onset 7 months ago. No injury.         HPI:   This pleasant patient presents to the clinic complaining of a painful bunion deformity on her left foot.  Pain is as bad as 7 out of 10 started 7 months ago she has tried wider shoes she is tried padding with no relief from any symptoms and she cannot find comfortable shoes to wear.  At today's visit reviewed nurse's history as taken above, allergies medications and medical history as documented below.  All changes duly noted  Allergies: Radiology contrast iodinated dyes and Sulfa antibiotics   Current Outpatient Medications   Medication Sig Dispense Refill    ibuprofen 600 MG Oral Tab Take 1 tablet (600 mg total) by mouth every 6 hours with food 20 tablet 0    phenol 1.4 % Mouth/Throat Liquid Use as directed 1 mL in the mouth or throat every 2 (two) hours as needed. For 5 days 177 mL 0    diclofenac 75 MG Oral Tab EC Take 1 tablet (75 mg total) by mouth 2 (two) times daily. (Patient not taking: Reported on 4/8/2024) 28 tablet 1    diclofenac 75 MG Oral Tab EC Take 1 tablet (75 mg total) by mouth 2 (two) times daily. (Patient not taking: Reported on 4/8/2024) 28 tablet 1      Past Medical History:    Abdominal pain    Anemia    Back pain    Belching    Bloating    Chest pain    Constipation    Decorative tattoo    Diarrhea, unspecified    Flatulence/gas pain/belching    Frequent use of laxatives    Headache disorder    Heartburn    History of cardiac murmur    Indigestion    Irregular bowel habits    Nausea    Pain with bowel movements    Problems with swallowing    Sleep disturbance    Trichomonosis    treated     Uncomfortable fullness after meals    Visual impairment    contacts    Vomiting    Wears glasses      Past Surgical History:   Procedure Laterality Date    Cholecystectomy  08/23/2021    Patient denies any surgical history      as of  2018      Family History   Problem Relation Age of Onset    High Blood Pressure Other         in the family     Diabetes Other         in the family     Other (liver Cancer) Mother     Diabetes Mother     Other Mother         Liver cancer    Other (Stomach Cancer) Father     Prostate Cancer Father     Diabetes Father     Hypertension Father     Other Father         Stomach cancer      Social History     Socioeconomic History    Marital status:     Number of children: 2   Occupational History    Occupation:    Tobacco Use    Smoking status: Former     Current packs/day: 0.00     Types: Cigarettes     Start date: 2020     Quit date: 2020     Years since quittin.6    Smokeless tobacco: Never    Tobacco comments:     quit 18months ago   Vaping Use    Vaping status: Former   Substance and Sexual Activity    Alcohol use: Not Currently     Alcohol/week: 2.0 standard drinks of alcohol     Types: 1 Glasses of wine, 1 Shots of liquor per week     Comment: social     Drug use: Yes     Frequency: 3.0 times per week     Types: Cannabis    Sexual activity: Yes     Partners: Female   Other Topics Concern    Blood Transfusions No    Caffeine Concern Yes     Comment: occ    Exercise Yes     Comment: 1-2 a week, walking     Seat Belt Yes           REVIEW OF SYSTEMS:   Today reviewed systens as documented below  GENERAL HEALTH: feels well otherwise  SKIN: Refer to exam below  RESPIRATORY: denies shortness of breath with exertion  CARDIOVASCULAR: denies chest pain on exertion  GI: denies abdominal pain and denies heartburn  NEURO: denies headaches    EXAM:   There were no vitals taken for this visit.  GENERAL: well developed, well nourished, in no apparent distress  EXTREMITIES:   1. Integument: The skin on her left foot was evaluated is warm and dry there is an enlarged medial eminence of the first metatarsal head when compared to the right.  It is painful to direct  palpation.   2. Vascular: Patient has palpable pulses dorsalis pedis posterior tibial   3. Neurologic: Patient has intact sensorium there is no deficits noted   4. Musculoskeletal: The patient has good muscle strength and she is ambulatory.  X-rays were taken AP and lateral on the left foot they do show a bunion deformity of the first metatarsal head with lateral deviation of the hallux and increased first intermetatarsal angle.  The sesamoids are also deviating laterally.  There are no osteoarthritic changes noted to the first MTP joint.  ASSESSMENT AND PLAN:   Diagnoses and all orders for this visit:    Hallux valgus of left foot    Bunion of great toe of left foot    Metatarsus primus varus of left foot    Other orders  -     [facility] AMB POD XR - LT FOOT 2 VIEWS(AP, LATERAL)WT BEARING        Plan: Today discussed different treatments wider shoes getting her shoes stretched out better padding she wants to try and get the bunion corrected.At today's visit both conservative and surgical management was discussed for the diagnoses listed above.  After a lengthy conversation the patient opted for surgery after questions were answered.  The nature and extent of the surgery which would be, Z bunionectomy with possible Akin osteotomy left foot both with internal bone screw fixation.  Was explained in great detail.  The pre-, cira-and postoperative management was discussed, along with changes in bathing habits as well.  The necessity for restricted activity possible nonweightbearing was also reviewed.  The possible complications and risks associated with the surgery including but not limited to recurrence of the deformity, nonresolution of the problem, infection as well as hospitalization and intravenous antibiotics if that occurs, the necessity for further surgery and/or hospitalization should complications occur or if an undesired result was obtained, and permanent numbness around the surgical site, guarantees or assurances  were not offered.  Questions were invited and answered to the best of my ability.  At this time the patient wished to proceed with the surgical procedure and we will try to get that scheduled to the patient's convenience.     The patient indicates understanding of these issues and agrees to the plan.    Akash Blevins DPM

## 2024-10-06 NOTE — TELEPHONE ENCOUNTER
Procedure: Bunionectomy with internal bone screw fixation left foot, possible Akin osteotomy with internal bone screw patient left foot  CPT code: 90021, 69829  Length of Surgery: 2 hours  Any Instruments: Mini power, mini fluoroscopy, [place] screw system  Call patient: within 24 hours  Anesthesia: MAC  Location: [facility]  Assistance: none  Pacemaker: No  Anticoagulants: No  Nickel Allergy: No  Latex Allergy: No  Diagnosis/ICD Code:     ICD-10-CM    1. Hallux valgus of left foot  M20.12       2. Bunion of great toe of left foot  M21.612       3. Metatarsus primus varus of left foot  Q66.212       4. Foot pain, left  M79.672

## 2024-10-09 NOTE — TELEPHONE ENCOUNTER
S/W patient and patient stated wanting to have surgery late in the year as patient is working on getting short-term disability approved through employer. Informed patient that Dr. Blevins was booked for November and that he would be stopping surgeries. Patient asked about seeing another podiatrist and I have provided information for all podiatrist in our group at this time. Patient expressed verbal understanding. Patient will reach out to PCP for advise on next steps.

## 2025-04-28 ENCOUNTER — OFFICE VISIT (OUTPATIENT)
Dept: PODIATRY CLINIC | Facility: CLINIC | Age: 45
End: 2025-04-28
Payer: COMMERCIAL

## 2025-04-28 ENCOUNTER — TELEPHONE (OUTPATIENT)
Dept: PODIATRY CLINIC | Facility: CLINIC | Age: 45
End: 2025-04-28

## 2025-04-28 DIAGNOSIS — M21.612 BUNION OF GREAT TOE OF LEFT FOOT: ICD-10-CM

## 2025-04-28 DIAGNOSIS — M20.12 HALLUX VALGUS OF LEFT FOOT: Primary | ICD-10-CM

## 2025-04-28 DIAGNOSIS — M79.672 FOOT PAIN, LEFT: ICD-10-CM

## 2025-04-28 PROCEDURE — 99214 OFFICE O/P EST MOD 30 MIN: CPT | Performed by: STUDENT IN AN ORGANIZED HEALTH CARE EDUCATION/TRAINING PROGRAM

## 2025-04-29 DIAGNOSIS — M20.12 HALLUX VALGUS OF LEFT FOOT: Primary | ICD-10-CM

## 2025-04-29 NOTE — TELEPHONE ENCOUNTER
Procedure:   Ziyad bunionectomy, left foot  Akin osteotomy, left foot  CPT code:   19205  2.    71103    Length of Surgery: 2 hours  Any Instruments: podiatry tray, small power, joanne screws + staple, mini fluoro  Call patient: ASAP  Anesthesia: Gen  Location: [facility]  Assistance: none  Pacemaker: No  Anticoagulants: No  Nickel Allergy: No  Latex Allergy: No  Diagnosis/ICD Code:     ICD-10-CM    1. Hallux valgus of left foot  M20.12

## 2025-04-29 NOTE — PROGRESS NOTES
[facility] Podiatry  Progress Note    Kate Frost is a 44 year old female.   Chief Complaint   Patient presents with    New Patient     Bunion left foot - pain 7/10         HPI:     Patient is a pleasant 44-year-old female presents to clinic for evaluation of painful bunion deformity to her left foot.  She has pain to site that she rates at a 7 out of 10.  Her main complaint is pain at prominent medial eminence.  She has exhausted conservative treatment options including shoe gear modification, anti-inflammatories, and activity modification.  She is interested in surgical treatment options at this time.  No other complaints are mentioned.  Past medical history, medications, and allergies reviewed.      Allergies: Radiology contrast iodinated dyes and Sulfa antibiotics   Current Medications[1]   Past Medical History[2]   Past Surgical History[3]   Family History[4]   Social Hx on file[5]        REVIEW OF SYSTEMS:     No n/v/f/c.      EXAM:   There were no vitals taken for this visit.  GENERAL: well developed, well nourished, in no apparent distress  EXTREMITIES:       1. Integument: Normal skin temperature and turgor  2. Vascular: Dorsalis pedis two out of four bilateral and posterior tibial pulses two out of   four bilateral, capillary refill normal.   3. Musculoskeletal: All muscle groups are graded 5 out of 5 in the foot and ankle.  Medial deviation of first metatarsal levitation of hallux of left foot consistent with moderate bunion deformity.  No pain first MPJ range of motion.  Pain noted to prominent medial eminence of first metatarsal.  Compartments are soft and compressible.  No strength deficits.   4. Neurological: Normal sharp dull sensation; reflexes normal.    Left foot x-rays: 10/6/2024:    X-rays were taken AP and lateral on the left foot they do show a bunion  deformity of the first metatarsal head with lateral deviation of the  hallux and increased first intermetatarsal angle.  The sesamoids are  also  deviating laterally.            ASSESSMENT AND PLAN:   Diagnoses and all orders for this visit:    Hallux valgus of left foot    Bunion of great toe of left foot    Foot pain, left        Plan:    -Patient examined, chart history reviewed.  -Discussed etiology of condition and various treatment options.  - Prior x-rays reviewed with patient.  She does have moderate bunion deformity with additional deformity to her proximal phalanx.  Her great toe joint appears mostly healthy in appearance.  - Discussed treatments at length including conservative and surgical treatment options.  Conservatively, discussed more to supportive shoes with wide toebox, anti-inflammatories, and activity modification.  Patient has exhausted conservative treatment options and is interested in surgery at this time.  - Surgically, discussed with patient that I think she would benefit from Roseanna bunionectomy with possible Akin osteotomy as well.  This was discussed including benefits, risks, recovery period.    All treatment options have been discussed with the patient including both conservative and surgical attempts at correction. Potential risks and complications of surgical intervention were discussed at length which including but not not limited to death, loss of limb, post op pain, swelling, infection, bleeding, reoccurrence of the deformity, extended healing, and the possibility of further and future surgery.  No guarantees have been made to the patient.  Our  will reach out to schedule surgery.  All questions answered to satisfaction.    The patient indicates understanding of these issues and agrees to the plan.        Cj Márquez DPM    Dragon speech recognition software was used to prepare this note.  Errors in word recognition may occur.  Please contact me with any questions/concerns with this note.          Left foot roseanna bunionectomy    Left foot akin osteotomy       [1]   Current Outpatient Medications    Medication Sig Dispense Refill    ibuprofen 600 MG Oral Tab Take 1 tablet (600 mg total) by mouth every 6 hours with food 20 tablet 0    phenol 1.4 % Mouth/Throat Liquid Use as directed 1 mL in the mouth or throat every 2 (two) hours as needed. For 5 days 177 mL 0    diclofenac 75 MG Oral Tab EC Take 1 tablet (75 mg total) by mouth 2 (two) times daily. (Patient not taking: Reported on 4/8/2024) 28 tablet 1    diclofenac 75 MG Oral Tab EC Take 1 tablet (75 mg total) by mouth 2 (two) times daily. (Patient not taking: Reported on 4/8/2024) 28 tablet 1   [2]   Past Medical History:   Abdominal pain    Anemia    Back pain    Belching    Bloating    Chest pain    Constipation    Decorative tattoo    Diarrhea, unspecified    Flatulence/gas pain/belching    Frequent use of laxatives    Headache disorder    Heartburn    History of cardiac murmur    Indigestion    Irregular bowel habits    Nausea    Pain with bowel movements    Problems with swallowing    Sleep disturbance    Trichomonosis    treated     Uncomfortable fullness after meals    Visual impairment    contacts    Vomiting    Wears glasses   [3]   Past Surgical History:  Procedure Laterality Date    Cholecystectomy  08/23/2021    Patient denies any surgical history      as of 11/14/2018   [4]   Family History  Problem Relation Age of Onset    High Blood Pressure Other         in the family     Diabetes Other         in the family     Other (liver Cancer) Mother     Diabetes Mother     Other Mother         Liver cancer    Other (Stomach Cancer) Father     Prostate Cancer Father     Diabetes Father     Hypertension Father     Other Father         Stomach cancer   [5]   Social History  Socioeconomic History    Marital status:     Number of children: 2   Occupational History    Occupation:    Tobacco Use    Smoking status: Former     Current packs/day: 0.00     Types: Cigarettes     Start date: 2/13/2020     Quit date:  2020     Years since quittin.2    Smokeless tobacco: Never    Tobacco comments:     quit 18months ago   Vaping Use    Vaping status: Former   Substance and Sexual Activity    Alcohol use: Not Currently     Alcohol/week: 2.0 standard drinks of alcohol     Types: 1 Glasses of wine, 1 Shots of liquor per week     Comment: social     Drug use: Yes     Frequency: 3.0 times per week     Types: Cannabis    Sexual activity: Yes     Partners: Female   Other Topics Concern    Blood Transfusions No    Caffeine Concern Yes     Comment: occ    Exercise Yes     Comment: 1-2 a week, walking     Seat Belt Yes

## 2025-06-13 ENCOUNTER — ORDER TRANSCRIPTION (OUTPATIENT)
Dept: ADMINISTRATIVE | Facility: HOSPITAL | Age: 45
End: 2025-06-13

## 2025-06-13 DIAGNOSIS — M54.10 RADICULOPATHY AFFECTING UPPER EXTREMITY: ICD-10-CM

## 2025-06-13 DIAGNOSIS — M54.10 RADICULITIS: Primary | ICD-10-CM

## 2025-06-14 ENCOUNTER — LAB ENCOUNTER (OUTPATIENT)
Dept: LAB | Facility: HOSPITAL | Age: 45
End: 2025-06-14
Attending: FAMILY MEDICINE
Payer: COMMERCIAL

## 2025-06-14 DIAGNOSIS — Z00.00 ROUTINE GENERAL MEDICAL EXAMINATION AT A HEALTH CARE FACILITY: Primary | ICD-10-CM

## 2025-06-14 LAB
ALBUMIN SERPL-MCNC: 4.6 G/DL (ref 3.2–4.8)
ALBUMIN/GLOB SERPL: 1.6 {RATIO} (ref 1–2)
ALP LIVER SERPL-CCNC: 59 U/L (ref 37–98)
ALT SERPL-CCNC: 18 U/L (ref 10–49)
ANION GAP SERPL CALC-SCNC: 9 MMOL/L (ref 0–18)
AST SERPL-CCNC: 24 U/L (ref ?–34)
BASOPHILS # BLD AUTO: 0.01 X10(3) UL (ref 0–0.2)
BASOPHILS NFR BLD AUTO: 0.2 %
BILIRUB SERPL-MCNC: 0.4 MG/DL (ref 0.3–1.2)
BUN BLD-MCNC: 13 MG/DL (ref 9–23)
CALCIUM BLD-MCNC: 9.4 MG/DL (ref 8.7–10.6)
CHLORIDE SERPL-SCNC: 105 MMOL/L (ref 98–112)
CHOLEST SERPL-MCNC: 145 MG/DL (ref ?–200)
CO2 SERPL-SCNC: 28 MMOL/L (ref 21–32)
CREAT BLD-MCNC: 0.74 MG/DL (ref 0.55–1.02)
EGFRCR SERPLBLD CKD-EPI 2021: 104 ML/MIN/1.73M2 (ref 60–?)
EOSINOPHIL # BLD AUTO: 0.07 X10(3) UL (ref 0–0.7)
EOSINOPHIL NFR BLD AUTO: 1.6 %
ERYTHROCYTE [DISTWIDTH] IN BLOOD BY AUTOMATED COUNT: 12.4 %
EST. AVERAGE GLUCOSE BLD GHB EST-MCNC: 128 MG/DL (ref 68–126)
FASTING PATIENT LIPID ANSWER: YES
FASTING STATUS PATIENT QL REPORTED: YES
GLOBULIN PLAS-MCNC: 2.8 G/DL (ref 2–3.5)
GLUCOSE BLD-MCNC: 103 MG/DL (ref 70–99)
HBA1C MFR BLD: 6.1 % (ref ?–5.7)
HCT VFR BLD AUTO: 42 % (ref 35–48)
HDLC SERPL-MCNC: 60 MG/DL (ref 40–59)
HGB BLD-MCNC: 13.2 G/DL (ref 12–16)
IMM GRANULOCYTES # BLD AUTO: 0.01 X10(3) UL (ref 0–1)
IMM GRANULOCYTES NFR BLD: 0.2 %
LDLC SERPL CALC-MCNC: 73 MG/DL (ref ?–100)
LYMPHOCYTES # BLD AUTO: 1.34 X10(3) UL (ref 1–4)
LYMPHOCYTES NFR BLD AUTO: 31.2 %
MCH RBC QN AUTO: 29.7 PG (ref 26–34)
MCHC RBC AUTO-ENTMCNC: 31.4 G/DL (ref 31–37)
MCV RBC AUTO: 94.4 FL (ref 80–100)
MONOCYTES # BLD AUTO: 0.49 X10(3) UL (ref 0.1–1)
MONOCYTES NFR BLD AUTO: 11.4 %
NEUTROPHILS # BLD AUTO: 2.37 X10 (3) UL (ref 1.5–7.7)
NEUTROPHILS # BLD AUTO: 2.37 X10(3) UL (ref 1.5–7.7)
NEUTROPHILS NFR BLD AUTO: 55.4 %
NONHDLC SERPL-MCNC: 85 MG/DL (ref ?–130)
OSMOLALITY SERPL CALC.SUM OF ELEC: 294 MOSM/KG (ref 275–295)
PLATELET # BLD AUTO: 270 10(3)UL (ref 150–450)
POTASSIUM SERPL-SCNC: 4.3 MMOL/L (ref 3.5–5.1)
PROT SERPL-MCNC: 7.4 G/DL (ref 5.7–8.2)
RBC # BLD AUTO: 4.45 X10(6)UL (ref 3.8–5.3)
SODIUM SERPL-SCNC: 142 MMOL/L (ref 136–145)
TRIGL SERPL-MCNC: 54 MG/DL (ref 30–149)
TSI SER-ACNC: 0.44 UIU/ML (ref 0.55–4.78)
URATE SERPL-MCNC: 5.3 MG/DL (ref 3.1–7.8)
VLDLC SERPL CALC-MCNC: 8 MG/DL (ref 0–30)
WBC # BLD AUTO: 4.3 X10(3) UL (ref 4–11)

## 2025-06-14 PROCEDURE — 84443 ASSAY THYROID STIM HORMONE: CPT

## 2025-06-14 PROCEDURE — 84550 ASSAY OF BLOOD/URIC ACID: CPT

## 2025-06-14 PROCEDURE — 81015 MICROSCOPIC EXAM OF URINE: CPT

## 2025-06-14 PROCEDURE — 80061 LIPID PANEL: CPT

## 2025-06-14 PROCEDURE — 85025 COMPLETE CBC W/AUTO DIFF WBC: CPT

## 2025-06-14 PROCEDURE — 36415 COLL VENOUS BLD VENIPUNCTURE: CPT

## 2025-06-14 PROCEDURE — 83036 HEMOGLOBIN GLYCOSYLATED A1C: CPT

## 2025-06-14 PROCEDURE — 80053 COMPREHEN METABOLIC PANEL: CPT

## 2025-07-05 ENCOUNTER — HOSPITAL ENCOUNTER (OUTPATIENT)
Dept: CT IMAGING | Age: 45
Discharge: HOME | End: 2025-07-05
Attending: FAMILY MEDICINE
Payer: COMMERCIAL

## 2025-07-05 DIAGNOSIS — M54.10 RADICULITIS: ICD-10-CM

## 2025-07-05 DIAGNOSIS — M54.10 RADICULOPATHY AFFECTING UPPER EXTREMITY: ICD-10-CM

## 2025-07-05 PROCEDURE — 72125 CT NECK SPINE W/O DYE: CPT | Performed by: FAMILY MEDICINE

## 2025-07-25 PROBLEM — M77.8 TENDINITIS OF LEFT SHOULDER: Status: ACTIVE | Noted: 2024-02-03

## 2025-07-25 PROBLEM — R07.9 CHEST PAIN: Status: ACTIVE | Noted: 2024-09-27

## 2025-07-25 PROBLEM — M54.9 BACK PAIN: Status: ACTIVE | Noted: 2024-09-27

## 2025-07-31 ENCOUNTER — TELEPHONE (OUTPATIENT)
Dept: PODIATRY CLINIC | Facility: CLINIC | Age: 45
End: 2025-07-31

## (undated) NOTE — MR AVS SNAPSHOT
[address] at [place] [address]  [phone]               Thank you for choosing us for your health care visit with Vu Kan MD.  We are glad to serve you and happy to provide you wi Iodine (Topical) Hives, Swelling    Sulfa Antibiotics Hives                Today's Vital Signs     BP Height Weight BMI       120/60 mmHg 64\" 216 lb 6.4 oz 37.13 kg/m2          Current Medications      Notice  As of 6/16/2017  8:32 AM    You have not bee Tips for increasing your physical activity – Adults who are physically active are less likely to develop some chronic diseases than adults who are inactive.      HOW TO GET STARTED: HOW TO STAY MOTIVATED:   Start activities slowly and build up over time Do

## (undated) NOTE — LETTER
Date & Time: 11/25/2019, 11:56 AM  Patient: Marquise Neely  Encounter Provider(s):    Percy Montalvo MD       To Whom It May Concern:    Marquise Neely was seen and treated in our department on 11/25/2019. She should not return to work until 11/27/19.

## (undated) NOTE — LETTER
Date & Time: 9/3/2019, 9:44 PM  Patient: Ariana Cordova  Encounter Provider(s):    Amisha Sommers MD       To Whom It May Concern:    Ariana Cordova was seen and treated in our department on 9/3/2019. She should not return to work until 9/5/2019.     If you

## (undated) NOTE — LETTER
DATE:   To Whom It May Concern:    Kate Frost is currently under my medical care and she may return to work on 02.27.2024.    Activity is restricted as follows:     - Light duty until follow-up    If you require additional information please contact our office.      Cedrick Wright DO, CAQSM   Primary Care Sports Medicine    Department of Orthopaedic Surgery  [address] [address]   [address] [place] [address]    t: [phone]  f: [phone]      [facility]

## (undated) NOTE — LETTER
Liv Mac, [facility]:0[birth year]    CONSENT FOR PROCEDURE/SEDATION    1. I authorize the performance upon Liv Mac  the following: Endometrial Biopsy    2.  I authorize Dr. Robert Shelton MD (and whomever is designated as the doctor’s assistant), to perform the a Witness: _________________________________________ Date:___________     Physician Signature: _______________________________ Date:___________

## (undated) NOTE — ED AVS SNAPSHOT
[facility] AND [facility] in [place] [id number]    Phone:  [phone]           Leida Farrell   MRN: [id number]    Department:  [facility] AND [facility] in [place]   Date of Visit:  5/13/2017           Kayla instructed with your primary care doctor, please return to immediate care.      Discharge References/Attachments     SORE THROAT, WHEN YOU HAVE A (ENGLISH)      Disclosure     Insurance plans vary and the physician(s) referred by the Immediate Care may not Registration line at [phone] or find a doctor online by visiting [email or web address].    IF THERE IS ANY CHANGE OR WORSENING OF YOUR CONDITION, CALL YOUR PRIMARY CARE PHYSICIAN AT ONCE OR GO TO [address] [place] [address].     If you have been prescribed a - If you are a smoker or have smoked in the last 12 months, we encourage you to explore options for quitting.     - If you have concerns related to behavioral health issues or thoughts of harming yourself, contact [facility] a

## (undated) NOTE — ED AVS SNAPSHOT
Darlene Carver   MRN: [id number]    Department:  [facility]   Date of Visit:  9/3/2019           Disclosure     Insurance plans vary and the physician(s) referred by the ER may not be covered by your plan.  Please contact your CARE PHYSICIAN AT ONCE OR RETURN IMMEDIATELY TO THE EMERGENCY DEPARTMENT. If you have been prescribed any medication(s), please fill your prescription right away and begin taking the medication(s) as directed.   If you believe that any of the medications

## (undated) NOTE — LETTER
[facility]  Administration of Anesthesia  1. Bethanie Heydi, or _________________________________ acting on her behalf, (Patient) (Dependent/Representative) request to receive anesthesia for my pending procedure/operation/treatment.   A liuy bleeding, seizure, cardiac arrest and death. 7. AWARENESS: I understand that it is possible (but unlikely) to have explicit memory of events from the operating room while under general anesthesia.   8. ELECTROCONVULSIVE THERAPY PATIENTS: This consent serve below affirms that prior to the time of the procedure, I have explained to the patient and/or his/her guardian, the risks and benefits of undergoing anesthesia, as well as any reasonable alternatives.     ___________________________________________________

## (undated) NOTE — LETTER
DATE: 02.27.2024  To Whom It May Concern:    Kate Frost is currently under my medical care and she may return to work on 02.28.2024.    Activity is restricted as follows:     - Light duty until follow-up  - 5 LB weight limit    If you require additional information please contact our office.      Cedrick Wright DO, ESTELAM   Primary Care Sports Medicine    Department of Orthopaedic Surgery  [address] [address]   [address] [place] [address]    t: [phone]  f: [phone]      [facility]